# Patient Record
Sex: MALE | Race: WHITE | NOT HISPANIC OR LATINO | Employment: OTHER | URBAN - METROPOLITAN AREA
[De-identification: names, ages, dates, MRNs, and addresses within clinical notes are randomized per-mention and may not be internally consistent; named-entity substitution may affect disease eponyms.]

---

## 2024-06-20 ENCOUNTER — OFFICE VISIT (OUTPATIENT)
Dept: ORTHOPEDIC SURGERY | Facility: CLINIC | Age: 64
End: 2024-06-20
Payer: MEDICARE

## 2024-06-20 ENCOUNTER — HOSPITAL ENCOUNTER (OUTPATIENT)
Dept: RADIOLOGY | Facility: HOSPITAL | Age: 64
Discharge: HOME | End: 2024-06-20
Payer: MEDICARE

## 2024-06-20 DIAGNOSIS — M54.10 BACK PAIN WITH RADICULOPATHY: ICD-10-CM

## 2024-06-20 DIAGNOSIS — M54.10 BACK PAIN WITH RADICULOPATHY: Primary | ICD-10-CM

## 2024-06-20 PROCEDURE — 72100 X-RAY EXAM L-S SPINE 2/3 VWS: CPT

## 2024-06-20 PROCEDURE — 99204 OFFICE O/P NEW MOD 45 MIN: CPT | Performed by: PHYSICIAN ASSISTANT

## 2024-06-20 PROCEDURE — 72100 X-RAY EXAM L-S SPINE 2/3 VWS: CPT | Performed by: RADIOLOGY
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
## 2024-06-24 DIAGNOSIS — M54.10 BACK PAIN WITH RADICULOPATHY: ICD-10-CM

## 2024-06-24 DIAGNOSIS — M51.36 DDD (DEGENERATIVE DISC DISEASE), LUMBAR: ICD-10-CM

## 2024-06-24 DIAGNOSIS — M51.34 DDD (DEGENERATIVE DISC DISEASE), THORACIC: ICD-10-CM

## 2024-07-09 ENCOUNTER — HOSPITAL ENCOUNTER (OUTPATIENT)
Dept: RADIOLOGY | Facility: HOSPITAL | Age: 64
Discharge: HOME | End: 2024-07-09
Payer: MEDICARE

## 2024-07-09 DIAGNOSIS — M54.10 BACK PAIN WITH RADICULOPATHY: ICD-10-CM

## 2024-07-09 DIAGNOSIS — M51.34 DDD (DEGENERATIVE DISC DISEASE), THORACIC: ICD-10-CM

## 2024-07-09 DIAGNOSIS — M51.36 DDD (DEGENERATIVE DISC DISEASE), LUMBAR: ICD-10-CM

## 2024-07-09 PROCEDURE — 72148 MRI LUMBAR SPINE W/O DYE: CPT

## 2024-07-09 PROCEDURE — 72146 MRI CHEST SPINE W/O DYE: CPT | Performed by: RADIOLOGY

## 2024-07-09 PROCEDURE — 72146 MRI CHEST SPINE W/O DYE: CPT

## 2024-07-09 PROCEDURE — 72148 MRI LUMBAR SPINE W/O DYE: CPT | Performed by: RADIOLOGY

## 2024-07-23 ENCOUNTER — OFFICE VISIT (OUTPATIENT)
Dept: ORTHOPEDIC SURGERY | Facility: CLINIC | Age: 64
End: 2024-07-23
Payer: MEDICARE

## 2024-07-23 DIAGNOSIS — M54.16 LUMBAR RADICULOPATHY: Primary | ICD-10-CM

## 2024-07-23 DIAGNOSIS — M54.16 LUMBAR RADICULOPATHY: ICD-10-CM

## 2024-07-23 PROCEDURE — 99215 OFFICE O/P EST HI 40 MIN: CPT | Performed by: ORTHOPAEDIC SURGERY

## 2024-07-23 PROCEDURE — 1036F TOBACCO NON-USER: CPT | Performed by: ORTHOPAEDIC SURGERY

## 2024-07-23 ASSESSMENT — ENCOUNTER SYMPTOMS
LOSS OF SENSATION IN FEET: 0
OCCASIONAL FEELINGS OF UNSTEADINESS: 0
DEPRESSION: 0
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
## 2024-07-24 ENCOUNTER — OFFICE VISIT (OUTPATIENT)
Dept: CARDIOLOGY | Facility: CLINIC | Age: 64
End: 2024-07-24
Payer: MEDICARE

## 2024-07-24 ENCOUNTER — PHARMACY VISIT (OUTPATIENT)
Dept: PHARMACY | Facility: CLINIC | Age: 64
End: 2024-07-24
Payer: COMMERCIAL

## 2024-07-24 VITALS
BODY MASS INDEX: 26.39 KG/M2 | WEIGHT: 178.2 LBS | SYSTOLIC BLOOD PRESSURE: 130 MMHG | HEART RATE: 71 BPM | DIASTOLIC BLOOD PRESSURE: 62 MMHG | HEIGHT: 69 IN

## 2024-07-24 DIAGNOSIS — R94.31 ABNORMAL EKG: Primary | ICD-10-CM

## 2024-07-24 DIAGNOSIS — R01.1 CARDIAC MURMUR: ICD-10-CM

## 2024-07-24 DIAGNOSIS — I51.7 LVH (LEFT VENTRICULAR HYPERTROPHY): ICD-10-CM

## 2024-07-24 DIAGNOSIS — Z01.810 PREOPERATIVE CARDIOVASCULAR EXAMINATION: ICD-10-CM

## 2024-07-24 DIAGNOSIS — G47.33 OBSTRUCTIVE SLEEP APNEA SYNDROME, SEVERE: ICD-10-CM

## 2024-07-24 DIAGNOSIS — R06.83 SNORING: ICD-10-CM

## 2024-07-24 DIAGNOSIS — I35.0 NONRHEUMATIC AORTIC VALVE STENOSIS: ICD-10-CM

## 2024-07-24 DIAGNOSIS — R55 SYNCOPE AND COLLAPSE: ICD-10-CM

## 2024-07-24 DIAGNOSIS — F41.1 GAD (GENERALIZED ANXIETY DISORDER): Chronic | ICD-10-CM

## 2024-07-24 DIAGNOSIS — R00.2 PALPITATIONS: ICD-10-CM

## 2024-07-24 DIAGNOSIS — M48.02 CERVICAL SPINAL STENOSIS: ICD-10-CM

## 2024-07-24 DIAGNOSIS — M54.50 LOW BACK PAIN, UNSPECIFIED BACK PAIN LATERALITY, UNSPECIFIED CHRONICITY, UNSPECIFIED WHETHER SCIATICA PRESENT: ICD-10-CM

## 2024-07-24 DIAGNOSIS — I25.10 CORONARY ARTERY DISEASE INVOLVING NATIVE CORONARY ARTERY OF NATIVE HEART, UNSPECIFIED WHETHER ANGINA PRESENT: ICD-10-CM

## 2024-07-24 DIAGNOSIS — R42 DIZZINESS: ICD-10-CM

## 2024-07-24 DIAGNOSIS — R09.89 BILATERAL CAROTID BRUITS: ICD-10-CM

## 2024-07-24 DIAGNOSIS — R79.89 ELEVATED TROPONIN: ICD-10-CM

## 2024-07-24 DIAGNOSIS — Z87.891 PAST USE OF TOBACCO: ICD-10-CM

## 2024-07-24 DIAGNOSIS — R07.9 CHEST PAIN, UNSPECIFIED TYPE: ICD-10-CM

## 2024-07-24 DIAGNOSIS — E78.01 FAMILIAL HYPERCHOLESTEROLEMIA: ICD-10-CM

## 2024-07-24 DIAGNOSIS — R42 LIGHTHEADED: ICD-10-CM

## 2024-07-24 DIAGNOSIS — R06.09 DYSPNEA ON EXERTION: ICD-10-CM

## 2024-07-24 DIAGNOSIS — R93.1 ELEVATED CORONARY ARTERY CALCIUM SCORE: ICD-10-CM

## 2024-07-24 DIAGNOSIS — Z87.891 FORMER SMOKER, STOPPED SMOKING IN DISTANT PAST: ICD-10-CM

## 2024-07-24 DIAGNOSIS — Z82.49 FAMILY HISTORY OF ISCHEMIC HEART DISEASE: ICD-10-CM
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
## 2024-07-24 PROBLEM — M54.6 THORACIC SPINE PAIN: Status: ACTIVE | Noted: 2022-12-14

## 2024-07-24 PROBLEM — Z86.718 HISTORY OF DEEP VENOUS THROMBOSIS (DVT) OF DISTAL VEIN OF LEFT LOWER EXTREMITY: Status: ACTIVE | Noted: 2023-08-01

## 2024-07-24 PROBLEM — R03.0 ELEVATED BP WITHOUT DIAGNOSIS OF HYPERTENSION: Status: ACTIVE | Noted: 2023-08-01

## 2024-07-24 PROBLEM — M54.2 NECK PAIN, CHRONIC: Status: ACTIVE | Noted: 2022-12-14

## 2024-07-24 PROBLEM — R10.9 ABDOMINAL DISCOMFORT: Status: ACTIVE | Noted: 2024-07-19

## 2024-07-24 PROBLEM — G89.29 CHRONIC BACK PAIN: Chronic | Status: ACTIVE | Noted: 2024-07-19

## 2024-07-24 PROBLEM — G89.29 NECK PAIN, CHRONIC: Status: ACTIVE | Noted: 2022-12-14

## 2024-07-24 PROBLEM — M79.10 MYALGIA: Status: ACTIVE | Noted: 2022-12-14

## 2024-07-24 PROBLEM — M54.9 CHRONIC BACK PAIN: Chronic | Status: ACTIVE | Noted: 2024-07-19

## 2024-07-24 PROBLEM — N18.31 STAGE 3A CHRONIC KIDNEY DISEASE (MULTI): Chronic | Status: ACTIVE | Noted: 2023-08-01

## 2024-07-24 PROCEDURE — 99205 OFFICE O/P NEW HI 60 MIN: CPT | Performed by: INTERNAL MEDICINE

## 2024-07-24 PROCEDURE — RXMED WILLOW AMBULATORY MEDICATION CHARGE

## 2024-07-24 PROCEDURE — 1159F MED LIST DOCD IN RCRD: CPT | Performed by: INTERNAL MEDICINE

## 2024-07-24 PROCEDURE — 3008F BODY MASS INDEX DOCD: CPT | Performed by: INTERNAL MEDICINE

## 2024-07-24 RX ORDER — EVOLOCUMAB 140 MG/ML
140 INJECTION, SOLUTION SUBCUTANEOUS
Qty: 6 ML | Refills: 3 | Status: SHIPPED | OUTPATIENT
Start: 2024-07-24 | End: 2025-07-24

## 2024-07-24 RX ORDER — METOPROLOL SUCCINATE 25 MG/1
25 TABLET, EXTENDED RELEASE ORAL DAILY
Qty: 90 TABLET | Refills: 0 | Status: SHIPPED | OUTPATIENT
Start: 2024-07-24 | End: 2024-10-22

## 2024-07-24 RX ORDER — EZETIMIBE 10 MG/1
10 TABLET ORAL DAILY
Qty: 90 TABLET | Refills: 0 | Status: SHIPPED | OUTPATIENT
Start: 2024-07-24 | End: 2024-10-22

## 2024-07-24 RX ORDER — ASPIRIN 81 MG/1
81 TABLET ORAL DAILY
Qty: 90 TABLET | Refills: 3 | Status: SHIPPED | OUTPATIENT
Start: 2024-07-24 | End: 2025-07-24

## 2024-07-24 RX ORDER — TRAMADOL HYDROCHLORIDE 50 MG/1
50 TABLET ORAL EVERY 8 HOURS PRN
COMMUNITY
Start: 2024-07-19 | End: 2024-07-26

## 2024-07-24 RX ORDER — AMINOPHYLLINE 25 MG/ML
125 INJECTION, SOLUTION INTRAVENOUS ONCE AS NEEDED
OUTPATIENT
Start: 2024-07-24

## 2024-07-24 RX ORDER — TIZANIDINE HYDROCHLORIDE 4 MG/1
1 CAPSULE, GELATIN COATED ORAL
COMMUNITY
Start: 2023-12-07

## 2024-07-24 RX ORDER — ALPRAZOLAM 1 MG/1
1 TABLET ORAL
COMMUNITY

## 2024-07-24 RX ORDER — TADALAFIL 5 MG/1
5 TABLET ORAL DAILY
COMMUNITY

## 2024-07-24 RX ORDER — DOXYCYCLINE 100 MG/1
100 TABLET ORAL 2 TIMES DAILY
COMMUNITY
Start: 2024-01-18 | End: 2024-01-23

## 2024-07-24 RX ORDER — REGADENOSON 0.08 MG/ML
0.4 INJECTION, SOLUTION INTRAVENOUS
OUTPATIENT
Start: 2024-07-24

## 2024-07-24 NOTE — PATIENT INSTRUCTIONS
START REPATHA 140 MG/ML EVERY 2 WEEKS   START ZETIA 10 MG DAILY   START TOPROL XL 25 MG DAILY   START EC ASPIRIN 81 MG DAILY     Exercise diet weight loss program.     Stay plenty HYDRATED     Use My Chart portal for reviewing records, testing and contacting office.      Please bring all medicines, vitamins, and herbal supplements with you in original bottles to every appointment!!!!    Prescriptions will not be filled unless you are compliant with your follow up appointments or have a follow up appointment scheduled as per instruction of your physician. Refills should be requested at the time of your visit.
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
## 2024-07-25 ENCOUNTER — PHARMACY VISIT (OUTPATIENT)
Dept: PHARMACY | Facility: CLINIC | Age: 64
End: 2024-07-25
Payer: MEDICARE

## 2024-07-25 ENCOUNTER — TELEPHONE (OUTPATIENT)
Dept: CARDIOLOGY | Facility: CLINIC | Age: 64
End: 2024-07-25
Payer: MEDICARE

## 2024-07-25 NOTE — TELEPHONE ENCOUNTER
2 WEEK ZIO PATCH 61573/14591 No auth required per [facility] portal ref# [id number]

## 2024-08-29 ENCOUNTER — CLINICAL SUPPORT (OUTPATIENT)
Dept: CARDIOLOGY | Facility: HOSPITAL | Age: 64
End: 2024-08-29
Payer: MEDICARE

## 2024-08-29 ENCOUNTER — APPOINTMENT (OUTPATIENT)
Dept: CARDIOLOGY | Facility: CLINIC | Age: 64
End: 2024-08-29
Payer: MEDICARE

## 2024-08-29 ENCOUNTER — ANCILLARY PROCEDURE (OUTPATIENT)
Dept: CARDIOLOGY | Facility: HOSPITAL | Age: 64
End: 2024-08-29
Payer: MEDICARE

## 2024-08-29 DIAGNOSIS — R07.9 CHEST PAIN, UNSPECIFIED TYPE: ICD-10-CM

## 2024-08-29 DIAGNOSIS — Z82.49 FAMILY HISTORY OF ISCHEMIC HEART DISEASE: ICD-10-CM

## 2024-08-29 DIAGNOSIS — Z87.891 FORMER SMOKER, STOPPED SMOKING IN DISTANT PAST: ICD-10-CM

## 2024-08-29 DIAGNOSIS — R01.1 CARDIAC MURMUR: ICD-10-CM

## 2024-08-29 DIAGNOSIS — R06.09 DYSPNEA ON EXERTION: ICD-10-CM

## 2024-08-29 DIAGNOSIS — R42 DIZZINESS: ICD-10-CM

## 2024-08-29 DIAGNOSIS — Z87.891 PAST USE OF TOBACCO: ICD-10-CM

## 2024-08-29 DIAGNOSIS — I25.10 CORONARY ARTERY DISEASE INVOLVING NATIVE CORONARY ARTERY OF NATIVE HEART, UNSPECIFIED WHETHER ANGINA PRESENT: ICD-10-CM

## 2024-08-29 DIAGNOSIS — F41.1 GAD (GENERALIZED ANXIETY DISORDER): Chronic | ICD-10-CM

## 2024-08-29 DIAGNOSIS — R09.89 BILATERAL CAROTID BRUITS: ICD-10-CM

## 2024-08-29 DIAGNOSIS — R42 LIGHTHEADED: ICD-10-CM

## 2024-08-29 DIAGNOSIS — E78.01 FAMILIAL HYPERCHOLESTEROLEMIA: ICD-10-CM

## 2024-08-29 DIAGNOSIS — I35.0 NONRHEUMATIC AORTIC VALVE STENOSIS: ICD-10-CM

## 2024-08-29 DIAGNOSIS — R55 SYNCOPE AND COLLAPSE: ICD-10-CM

## 2024-08-29 DIAGNOSIS — G47.33 OBSTRUCTIVE SLEEP APNEA SYNDROME, SEVERE: ICD-10-CM

## 2024-08-29 DIAGNOSIS — M48.02 CERVICAL SPINAL STENOSIS: ICD-10-CM

## 2024-08-29 DIAGNOSIS — I51.7 LVH (LEFT VENTRICULAR HYPERTROPHY): ICD-10-CM

## 2024-08-29 DIAGNOSIS — R93.1 ELEVATED CORONARY ARTERY CALCIUM SCORE: ICD-10-CM

## 2024-08-29 DIAGNOSIS — R00.2 PALPITATIONS: ICD-10-CM

## 2024-08-29 DIAGNOSIS — R94.31 ABNORMAL EKG: ICD-10-CM

## 2024-08-29 DIAGNOSIS — M54.50 LOW BACK PAIN, UNSPECIFIED BACK PAIN LATERALITY, UNSPECIFIED CHRONICITY, UNSPECIFIED WHETHER SCIATICA PRESENT: ICD-10-CM

## 2024-08-29 DIAGNOSIS — R79.89 ELEVATED TROPONIN: ICD-10-CM

## 2024-08-29 DIAGNOSIS — R06.83 SNORING: ICD-10-CM

## 2024-08-29 DIAGNOSIS — Z01.810 PREOPERATIVE CARDIOVASCULAR EXAMINATION: ICD-10-CM

## 2024-08-29 DIAGNOSIS — Z13.6 ENCOUNTER FOR SCREENING FOR CARDIOVASCULAR DISORDERS: ICD-10-CM

## 2024-08-29 PROCEDURE — 76706 US ABDL AORTA SCREEN AAA: CPT | Performed by: INTERNAL MEDICINE

## 2024-08-29 PROCEDURE — 93880 EXTRACRANIAL BILAT STUDY: CPT

## 2024-08-29 PROCEDURE — 93306 TTE W/DOPPLER COMPLETE: CPT

## 2024-08-29 PROCEDURE — 93306 TTE W/DOPPLER COMPLETE: CPT | Performed by: INTERNAL MEDICINE

## 2024-08-29 PROCEDURE — 93880 EXTRACRANIAL BILAT STUDY: CPT | Performed by: INTERNAL MEDICINE

## 2024-08-29 PROCEDURE — 76706 US ABDL AORTA SCREEN AAA: CPT

## 2024-08-29 PROCEDURE — 78452 HT MUSCLE IMAGE SPECT MULT: CPT

## 2024-08-29 RX ORDER — AMINOPHYLLINE 25 MG/ML
125 INJECTION, SOLUTION INTRAVENOUS ONCE AS NEEDED
Status: SHIPPED | OUTPATIENT
Start: 2024-08-29

## 2024-08-29 RX ORDER — REGADENOSON 0.08 MG/ML
0.4 INJECTION, SOLUTION INTRAVENOUS
Status: COMPLETED | OUTPATIENT
Start: 2024-08-29 | End: 2024-08-29

## 2024-08-30 LAB
AORTIC VALVE MEAN GRADIENT: 7 MMHG
AORTIC VALVE PEAK VELOCITY: 1.75 M/S
AV PEAK GRADIENT: 12.3 MMHG
AVA (PEAK VEL): 3.54 CM2
AVA (VTI): 3.64 CM2
EJECTION FRACTION APICAL 4 CHAMBER: 55.3
EJECTION FRACTION: 68 %
LEFT VENTRICLE INTERNAL DIMENSION DIASTOLE: 4.78 CM (ref 3.5–6)
LEFT VENTRICULAR OUTFLOW TRACT DIAMETER: 2.3 CM
LV EJECTION FRACTION BIPLANE: 56 %
MITRAL VALVE E/A RATIO: 0.85

## 2024-09-04 ENCOUNTER — APPOINTMENT (OUTPATIENT)
Dept: ORTHOPEDIC SURGERY | Facility: CLINIC | Age: 64
End: 2024-09-04
Payer: MEDICARE

## 2024-09-06 ENCOUNTER — TELEPHONE (OUTPATIENT)
Dept: ORTHOPEDIC SURGERY | Facility: CLINIC | Age: 64
End: 2024-09-06
Payer: MEDICARE

## 2024-09-06 DIAGNOSIS — M48.061 SPINAL STENOSIS, LUMBAR REGION WITHOUT NEUROGENIC CLAUDICATION: Primary | ICD-10-CM

## 2024-09-06 DIAGNOSIS — M43.16 SPONDYLOLISTHESIS, LUMBAR REGION: ICD-10-CM

## 2024-09-09 PROBLEM — M48.061 SPINAL STENOSIS, LUMBAR REGION WITHOUT NEUROGENIC CLAUDICATION: Status: ACTIVE | Noted: 2024-09-06

## 2024-09-09 PROBLEM — M43.16 SPONDYLOLISTHESIS, LUMBAR REGION: Status: ACTIVE | Noted: 2024-09-06

## 2024-09-20 ENCOUNTER — TELEPHONE (OUTPATIENT)
Dept: CARDIOLOGY | Facility: CLINIC | Age: 64
End: 2024-09-20
Payer: MEDICARE

## 2024-09-20 NOTE — TELEPHONE ENCOUNTER
Received cardiac clearance form from [facility]. Patient is scheduled for surgery on 11/11/2024. Form placed in Dr. Isidoro Crews MD, [facility] office. Patient scheduled to be seen in office on 09/23/24.

## 2024-09-23 ENCOUNTER — APPOINTMENT (OUTPATIENT)
Dept: CARDIOLOGY | Facility: CLINIC | Age: 64
End: 2024-09-23
Payer: MEDICARE

## 2024-09-23 VITALS
BODY MASS INDEX: 26.84 KG/M2 | DIASTOLIC BLOOD PRESSURE: 70 MMHG | HEIGHT: 69 IN | WEIGHT: 181.2 LBS | SYSTOLIC BLOOD PRESSURE: 134 MMHG | HEART RATE: 67 BPM

## 2024-09-23 DIAGNOSIS — E78.01 FAMILIAL HYPERCHOLESTEROLEMIA: ICD-10-CM

## 2024-09-23 DIAGNOSIS — R09.89 BILATERAL CAROTID BRUITS: ICD-10-CM

## 2024-09-23 DIAGNOSIS — Z87.891 FORMER SMOKER, STOPPED SMOKING IN DISTANT PAST: ICD-10-CM

## 2024-09-23 DIAGNOSIS — G47.33 OBSTRUCTIVE SLEEP APNEA SYNDROME, SEVERE: ICD-10-CM

## 2024-09-23 DIAGNOSIS — R01.1 CARDIAC MURMUR: ICD-10-CM

## 2024-09-23 DIAGNOSIS — Z82.49 FAMILY HISTORY OF ISCHEMIC HEART DISEASE: ICD-10-CM

## 2024-09-23 DIAGNOSIS — R93.1 ELEVATED CORONARY ARTERY CALCIUM SCORE: ICD-10-CM

## 2024-09-23 DIAGNOSIS — R06.09 DYSPNEA ON EXERTION: ICD-10-CM

## 2024-09-23 DIAGNOSIS — I51.7 LVH (LEFT VENTRICULAR HYPERTROPHY): ICD-10-CM

## 2024-09-23 DIAGNOSIS — M54.50 LOW BACK PAIN, UNSPECIFIED BACK PAIN LATERALITY, UNSPECIFIED CHRONICITY, UNSPECIFIED WHETHER SCIATICA PRESENT: ICD-10-CM

## 2024-09-23 DIAGNOSIS — R42 LIGHTHEADED: ICD-10-CM

## 2024-09-23 DIAGNOSIS — Z87.891 PAST USE OF TOBACCO: ICD-10-CM

## 2024-09-23 DIAGNOSIS — M48.02 CERVICAL SPINAL STENOSIS: ICD-10-CM

## 2024-09-23 DIAGNOSIS — R42 DIZZINESS: ICD-10-CM

## 2024-09-23 DIAGNOSIS — F41.1 GAD (GENERALIZED ANXIETY DISORDER): Chronic | ICD-10-CM

## 2024-09-23 DIAGNOSIS — I25.10 CORONARY ARTERY DISEASE INVOLVING NATIVE CORONARY ARTERY OF NATIVE HEART, UNSPECIFIED WHETHER ANGINA PRESENT: ICD-10-CM

## 2024-09-23 DIAGNOSIS — R06.83 SNORING: ICD-10-CM

## 2024-09-23 DIAGNOSIS — R55 SYNCOPE AND COLLAPSE: ICD-10-CM

## 2024-09-23 DIAGNOSIS — R79.89 ELEVATED TROPONIN: ICD-10-CM

## 2024-09-23 DIAGNOSIS — R07.9 CHEST PAIN, UNSPECIFIED TYPE: ICD-10-CM

## 2024-09-23 DIAGNOSIS — Z01.810 PREOPERATIVE CARDIOVASCULAR EXAMINATION: ICD-10-CM

## 2024-09-23 DIAGNOSIS — R94.31 ABNORMAL EKG: ICD-10-CM

## 2024-09-23 DIAGNOSIS — I35.0 NONRHEUMATIC AORTIC VALVE STENOSIS: ICD-10-CM

## 2024-09-23 DIAGNOSIS — R00.2 PALPITATIONS: ICD-10-CM

## 2024-09-23 PROCEDURE — 1036F TOBACCO NON-USER: CPT | Performed by: INTERNAL MEDICINE

## 2024-09-23 PROCEDURE — 1159F MED LIST DOCD IN RCRD: CPT | Performed by: INTERNAL MEDICINE

## 2024-09-23 PROCEDURE — 99214 OFFICE O/P EST MOD 30 MIN: CPT | Performed by: INTERNAL MEDICINE

## 2024-09-23 PROCEDURE — 3008F BODY MASS INDEX DOCD: CPT | Performed by: INTERNAL MEDICINE

## 2024-09-23 RX ORDER — TRAMADOL HYDROCHLORIDE 50 MG/1
50 TABLET ORAL EVERY 8 HOURS PRN
COMMUNITY
Start: 2024-09-12 | End: 2024-10-12

## 2024-09-23 RX ORDER — EZETIMIBE 10 MG/1
10 TABLET ORAL DAILY
Qty: 90 TABLET | Refills: 1 | Status: SHIPPED | OUTPATIENT
Start: 2024-09-23

## 2024-09-23 RX ORDER — METOPROLOL SUCCINATE 25 MG/1
25 TABLET, EXTENDED RELEASE ORAL DAILY
Qty: 90 TABLET | Refills: 1 | Status: SHIPPED | OUTPATIENT
Start: 2024-09-23
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
## 2024-09-23 NOTE — PATIENT INSTRUCTIONS
PLEASE BRING ALL MEDICATION BOTTLES TO OFFICE VISITS     STAY WELL HYDRATED   HAVE LABS DONE BEFORE NEXT OFFICE VISIT (FASTING LABS)

## 2024-09-24 ENCOUNTER — APPOINTMENT (OUTPATIENT)
Dept: ORTHOPEDIC SURGERY | Facility: CLINIC | Age: 64
End: 2024-09-24
Payer: MEDICARE

## 2024-09-27 ENCOUNTER — TELEPHONE (OUTPATIENT)
Dept: CARDIOLOGY | Facility: CLINIC | Age: 64
End: 2024-09-27
Payer: MEDICARE

## 2024-09-27 NOTE — TELEPHONE ENCOUNTER
[facility]   [facility]    Benigno Dc Eugenio #[id number] had 2 ziopatch monitors.  It appears according to the note that one fell off.      The 1st one was dictated already so there is no where for the physician to make a dictation or for it to be put in his worklist.    So,  please have the ordering physician review the scan in Epic under today's date.  They can create a note under documentation for dictation or further recommendations regarding this 2nd study.    Thanks!!    Dr. Isidoro Crews MD, FACC please review. Mickey MANRIQUE

## 2024-10-10 ENCOUNTER — LAB (OUTPATIENT)
Dept: LAB | Facility: LAB | Age: 64
End: 2024-10-10
Payer: MEDICARE

## 2024-10-10 DIAGNOSIS — M25.60 STIFFNESS OF UNSPECIFIED JOINT, NOT ELSEWHERE CLASSIFIED: ICD-10-CM

## 2024-10-10 DIAGNOSIS — G89.29 OTHER CHRONIC PAIN: ICD-10-CM

## 2024-10-10 DIAGNOSIS — M54.9 DORSALGIA, UNSPECIFIED: Primary | ICD-10-CM

## 2024-10-10 LAB
ALBUMIN SERPL BCP-MCNC: 4.2 G/DL (ref 3.4–5)
ALP SERPL-CCNC: 42 U/L (ref 33–136)
ALT SERPL W P-5'-P-CCNC: 21 U/L (ref 10–52)
ANION GAP SERPL CALC-SCNC: 9 MMOL/L (ref 10–20)
AST SERPL W P-5'-P-CCNC: 17 U/L (ref 9–39)
BASOPHILS # BLD AUTO: 0.03 X10*3/UL (ref 0–0.1)
BASOPHILS NFR BLD AUTO: 0.4 %
BILIRUB SERPL-MCNC: 0.5 MG/DL (ref 0–1.2)
BUN SERPL-MCNC: 21 MG/DL (ref 6–23)
CALCIUM SERPL-MCNC: 9.3 MG/DL (ref 8.6–10.3)
CHLORIDE SERPL-SCNC: 104 MMOL/L (ref 98–107)
CK SERPL-CCNC: 86 U/L (ref 0–325)
CO2 SERPL-SCNC: 30 MMOL/L (ref 21–32)
CREAT SERPL-MCNC: 1.38 MG/DL (ref 0.5–1.3)
CRP SERPL-MCNC: 0.39 MG/DL
EGFRCR SERPLBLD CKD-EPI 2021: 56 ML/MIN/1.73M*2
EOSINOPHIL # BLD AUTO: 0.03 X10*3/UL (ref 0–0.7)
EOSINOPHIL NFR BLD AUTO: 0.4 %
ERYTHROCYTE [DISTWIDTH] IN BLOOD BY AUTOMATED COUNT: 14.6 % (ref 11.5–14.5)
ERYTHROCYTE [SEDIMENTATION RATE] IN BLOOD BY WESTERGREN METHOD: 6 MM/H (ref 0–20)
GLUCOSE SERPL-MCNC: 125 MG/DL (ref 74–99)
HCT VFR BLD AUTO: 48.2 % (ref 41–52)
HGB BLD-MCNC: 15.7 G/DL (ref 13.5–17.5)
IMM GRANULOCYTES # BLD AUTO: 0.02 X10*3/UL (ref 0–0.7)
IMM GRANULOCYTES NFR BLD AUTO: 0.3 % (ref 0–0.9)
LYMPHOCYTES # BLD AUTO: 1.55 X10*3/UL (ref 1.2–4.8)
LYMPHOCYTES NFR BLD AUTO: 19.9 %
MCH RBC QN AUTO: 28.5 PG (ref 26–34)
MCHC RBC AUTO-ENTMCNC: 32.6 G/DL (ref 32–36)
MCV RBC AUTO: 88 FL (ref 80–100)
MONOCYTES # BLD AUTO: 0.54 X10*3/UL (ref 0.1–1)
MONOCYTES NFR BLD AUTO: 6.9 %
NEUTROPHILS # BLD AUTO: 5.63 X10*3/UL (ref 1.2–7.7)
NEUTROPHILS NFR BLD AUTO: 72.1 %
NRBC BLD-RTO: 0 /100 WBCS (ref 0–0)
PLATELET # BLD AUTO: 218 X10*3/UL (ref 150–450)
POTASSIUM SERPL-SCNC: 3.8 MMOL/L (ref 3.5–5.3)
PROT SERPL-MCNC: 6 G/DL (ref 6.4–8.2)
RBC # BLD AUTO: 5.5 X10*6/UL (ref 4.5–5.9)
SODIUM SERPL-SCNC: 139 MMOL/L (ref 136–145)
WBC # BLD AUTO: 7.8 X10*3/UL (ref 4.4–11.3)

## 2024-10-10 PROCEDURE — 36415 COLL VENOUS BLD VENIPUNCTURE: CPT

## 2024-10-10 PROCEDURE — 85025 COMPLETE CBC W/AUTO DIFF WBC: CPT

## 2024-10-10 PROCEDURE — 86140 C-REACTIVE PROTEIN: CPT

## 2024-10-10 PROCEDURE — 80053 COMPREHEN METABOLIC PANEL: CPT

## 2024-10-10 PROCEDURE — 85652 RBC SED RATE AUTOMATED: CPT

## 2024-10-10 PROCEDURE — 82550 ASSAY OF CK (CPK): CPT

## 2024-10-22 ENCOUNTER — OFFICE VISIT (OUTPATIENT)
Dept: ORTHOPEDIC SURGERY | Facility: CLINIC | Age: 64
End: 2024-10-22
Payer: MEDICARE

## 2024-10-22 DIAGNOSIS — M54.16 LUMBAR RADICULOPATHY: Primary | ICD-10-CM

## 2024-10-22 PROCEDURE — L0650 LSO SC R ANT/POS PNL PRE OTS: HCPCS | Performed by: ORTHOPAEDIC SURGERY

## 2024-10-22 PROCEDURE — 99211 OFF/OP EST MAY X REQ PHY/QHP: CPT | Performed by: ORTHOPAEDIC SURGERY

## 2024-10-22 NOTE — H&P (VIEW-ONLY)
This note was created using Onformonicsriter.  Subjective  CHAVA Becker is a 64 year old male.    HPI  CHAVA Becker is a 64 year old male who presents today for f/u chronic low back pain, anxiety and DIANE. He is still scheduled for laminectomy with a  neurosurgeon on . He is trying to cope with the paint until then and says it has become increasingly more debilitating. He is having more trouble doing household tasks like cooking and doing the dishes. He is taking Tramadol TID which helps a little and taking muscle relaxer. He is feeling a little more anxious due to his upcoming surgery and is still taking Xanax at night to help him sleep and calm his anxiety. He is compliant with using his CPAP every night. He sleeps well other than waking up because of his pain. He has benefited from use of his CPAP.     Past Medical History:   PAST MEDICAL HISTORY   Diagnosis Date   • ADD (attention deficit disorder)    • Aortic stenosis, mild     had echo 2023   • Colon polyp ?   • DVT (deep venous thrombosis) ([facility]) 2016    s/p long car drive took anticoag 6 months.    • Erectile dysfunction    • Hypogonadism in male    • Insomnia     Secondary to MONTRELL   • Murmur    • DIANE (obstructive sleep apnea)     on auto-CPAP     Past Surgical History:   PAST SURGICAL HISTORY   Procedure Laterality Date   • COLONOSCOPY      colon polyp   • COLONOSCOPY SCREENING     • EYE SURGERY HX Left     Lasik surgery   • HERNIA REPAIR HX      Umbilical   • KNEE SURGERY HX Bilateral age 17    Bakari knee arthroscopy   • SKIN SURGERY HX      Moh's Procedure, basal cell carcinoma, on face     Family History:   FAMILY HISTORY    Problem Relation Age of Onset   • Cancer Mother         Leukemia   • Peripheral Artery Disease Father         Legs   • Ischemic Heart Disease Father          of MI during the night (s/p leg artery surgery)   • Cancer Sister         Squamous cell carcinoma of the rectum   • other (PVD) Sister    • COPD Maternal  "Grandmother    • Cancer Maternal Grandfather         Brain and Bone?   • Ischemic Heart Disease Paternal Grandfather    • Heart Attack Paternal Uncle      Social History:   Social History     Tobacco Use   • Smoking status: Former     Current packs/day: 0.00     Average packs/day: 1 pack/day for 19.0 years (19.0 ttl pk-yrs)     Types: Cigarettes     Start date: 1995     Quit date: 2014     Years since quitting: 10.8   • Smokeless tobacco: Never   Vaping Use   • Vaping status: Former   Substance Use Topics   • Alcohol use: Yes     Comment: occasional- 6 pack a week   • Drug use: Never     Current Medications: tiZANidine HCl (ZANAFLEX) 4 mg capsule, Take 1 capsule by mouth three times a day as needed., Disp: 270 capsule, Rfl: 1  Tadalafil (CIALIS) 5 mg tablet, Take 1 tablet by mouth every afternoon., Disp: 90 tablet, Rfl: 1  clindamycin (CLEOCIN) 1 % external solution, Apply to Posterior scalp behind ears once daily., Disp: 60 mL, Rfl: 5  doxycycline monohydrate (MONODOX) 100 mg capsule, Take 1 capsule by mouth once daily., Disp: 30 capsule, Rfl: 3  evolocumab (REPATHA SURECLICK) 140 mg/mL pen injector, Inject 140 mg subcutaneously every 2 weeks., Disp: , Rfl:   aspirin 81 mg cap, Take by mouth., Disp: , Rfl:   Syringe with Needle, Disp, 3 mL 23 gauge x 1 1/2\" syrg, Use as directed once a week, Disp: 10 Each, Rfl: 1  multivitamin (MULTIPLE VITAMIN ORAL), Take by mouth once daily., Disp: , Rfl:   omega-3/dha/epa/dpa/fish oil (OMEGA-3 2100 ORAL), Take 1 capsule by mouth., Disp: , Rfl:   ALPRAZolam (XANAX) 1 mg tablet, Take 1 tablet by mouth at bedtime as needed for up to 90 days., Disp: 90 tablet, Rfl: 0  traMADol (ULTRAM) 50 mg tablet, Take 1 tablet by mouth every 6 hours as needed for pain for up to 30 days., Disp: , Rfl:   CPAP/BIPAP/OTHER, Type .CPAPSettings into a note to see current settings/supplies/DME information., Disp: 1 Each, Rfl: 0  [DISCONTINUED] traMADol (ULTRAM) 50 mg tablet, Take 1 tablet by mouth every " "8 hours as needed for pain for up to 30 days. Patient should start on October 14, 2024., Disp: 90 tablet, Rfl: 0  metoprolol succinate ER (TOPROL XL) 25 mg 24 hr tablet, Take 25 mg by mouth. (Patient not taking: Reported on 10/22/2024), Disp: , Rfl:   [DISCONTINUED] ezetimibe (ZETIA) 10 mg tablet, Take 10 mg by mouth. (Patient not taking: Reported on 10/22/2024), Disp: , Rfl:   [DISCONTINUED] ALPRAZolam (XANAX) 1 mg tablet, Take 1 tablet by mouth at bedtime as needed for up to 90 days., Disp: 90 tablet, Rfl: 0  ketoconazole (NIZORAL) 2 % shampoo, Apply to wet scalp, lather, rinse after 2-3 minutes; use 1-3 times weekly., Disp: 360 mL, Rfl: 1  testosterone cypionate (DEPO-TESTOSTERONE) 200 mg/mL injection, INJECT 1 ML INTRAMUSCULARLY ONCE WEEKLY, Disp: 10 mL, Rfl: 0    No facility-administered encounter medications on file as of 10/22/2024.      Allergies:   ALLERGIES   Allergen Reactions   • Sertraline           Other: See Comments     Sexual side effects   • Statins-Hmg-Coa Red* Myalgia       Vitals: /80  Pulse 65  Temp (Src) 98.3 (Temporal)  Ht 5' 9\" (1.75m)  Wt 183 lb 13.8 oz (83.4kg)  SpO2 97%  BMI 27.14 kg/(m^2).            Review of Systems See HPI. ROS otherwise negative.      Objective  /80   Pulse 65   Temp 36.8 °C (98.3 °F) (Temporal)   Ht 175.3 cm (5' 9\")   Wt 83.4 kg (183 lb 13.8 oz)   SpO2 97%   BMI 27.15 kg/m²   Physical Exam  Vitals reviewed.   Constitutional:       General: He is not in acute distress.     Appearance: Normal appearance. He is not ill-appearing, toxic-appearing or diaphoretic.   Cardiovascular:      Rate and Rhythm: Normal rate and regular rhythm.   Pulmonary:      Effort: Pulmonary effort is normal.      Breath sounds: Normal breath sounds.   Neurological:      General: No focal deficit present.      Mental Status: He is alert and oriented to person, place, and time.   Psychiatric:         Mood and Affect: Mood normal.         Behavior: Behavior normal. "          Assessment and Plan  ASSESSMENT/PLAN:  1. Chronic bilateral low back pain with bilateral sciatica - ICD9: 724.2, 724.3, 338.29, ICD10: M54.42, M54.41, G89.29 (primary diagnosis)  Proceed with surgery as scheduled for next month.   Pt can temporarily increase Tramadol use to QID     PDMP website checked and validated. All prescriptions have been APPROPRIATELY filled.  No suspicious activity was identified. 10/22/2024 by Kristin Whitaker PA-C     -  TRAMADOL 50 MG TABLET    2. Anxiety - ICD9: 300.00, ICD10: F41.9    Continue Xanax QHS prn. Pt again reminded about risk of overdose with BZD and opioid use and he assures me that he never takes them together.   -  ALPRAZOLAM 1 MG TABLET    3. Stage 3a chronic kidney disease (HCC) - ICD9: 585.3, ICD10: N18.31  Reviewed results of recent labs.   -  eGFR: 63 Improving  -  Counseled on avoiding NSAIDs, adequate hydration    4. DIANE on CPAP - ICD9: 327.23, ICD10: G47.33  Continue nightly CPAP use. Order for new device and supplies given to the pt to send to his supplier, [employer].       -  PAP THERAPY ORDER  -  CPAP/BIPAP/OTHER    Kristin Whitaker PA-C

## 2024-10-22 NOTE — PROGRESS NOTES
"Dao Becker \"Zaria" is a 64 y.o. male who presents for Pre-op Exam of the Lower Back (L4-5 MIS TLIF for 11/11/24 at [facility]).    HPI:  64-year-old gentleman here for preop exam.  He is scheduled undergo an L4-5 TLIF with globus retractor.  He denies any fever chills nausea vomiting night sweats.  He has no bowel or bladder complaints.    Physical exam:  Well-nourished, well kept.No lymphangitis or lymphadenopathy in the examined extremities.  Gait normal.  Can stand on heels and toes.   Examination of the back shows tenderness in the paraspinous musculature.  There is decreased range of motion in all directions due to guarding/muscle spasms and pain at extremes.  There is good strength and no instability.  Examination of the lower extremities reveals no point tenderness, swelling, or deformity.  Range of motion of the hips, knees, and ankles are full without crepitance, instability, or exacerbation of pain.  Strength is 5/5 throughout.  No redness, abrasions, or lesions on extremities  Gross sensation intact in the extremities. Affect normal.  Alert and oriented ×3.  Coordination normal.    Assessment:  64-year-old gentleman here for preop exam.  He is scheduled undergo an L4-5 TLIF with globus retractor.  Pre and postoperative information instructions were given to the patient.  The brace was fitted today, brace instructions were given to the patient.  Risks and benefits were discussed with Dr. Nelson on July 23, 2024.  Lifting twisting bending restrictions were discussed.  All questions were answered.  Patient is ready to proceed with surgery.    Plan:  We will see him back in the office 2 weeks after his surgery for his first postop visit.  We will get AP lateral x-rays at that time.    Da Baez PA-C    "

## 2024-10-24 NOTE — H&P (VIEW-ONLY)
CHAVA Becker is a 64 year old male here for a Medicare wellness visit.      Medicare Health Risk Assessment    General Health Very good   Exercise: Minutes/Day 0 min   Exercise: Days/Week On average, how many days per week do you engage in moderate to strenuous exercise (like a brisk walk)?: 0 days (back pain\)   Alcohol: Daily Use 2-3 times a week   Alcohol: Drinks/Day 1 or 2   Alcohol: 6 or more drinks Never   Feel off balance No   Concerns: Teeth/Dentures No   Concerns: Sexual function No   Troubled by feelings None of the above   Frequency: Eating healthy diet  Nearly every day   ADLs requiring help None of the above   Safety precautions in home/vehicle Yes   Smoke, vape, chews tobacco No   Difficulty hearing Yes   Difficulty seeing No     Current Providers  Specialists: I have reviewed specialist-related care of the patient in the medical record.  Current care team: Patient Care Team:  Kristin Whitaker PA-C as PCP - General (Physician Assistant)  Outside specialists seen: Dr Nelson- Neurosurgery at   Dr Mary Randhawa- dermatology  Dr Crews- cardiology at     Medical/Family history review  Reviewed and updated problem list, medical/surgical/family/social history, medications, and allergies.    Opioid use review  Opioid Medications (last 90 days)          10/10/2024    23:59 10/14/2024    00:00 10/22/2024    00:00   Opioid Medications   tramadol HCl   *   50 mg q 8 H PRN ORAL -Discontinued     tramadol HCl    *   50 mg q 8 H PRN ORAL    *   50 mg q 8 H PRN ORAL-Discontinued   tramadol HCl     *   50 mg q 6 H PRN ORAL       Details         * Outpatient prescription             Prescribed tramadol HCl (last 90 days)    Does patient have risk factors for opioid abuse? No    Pain overview     Current pain concerns and treatment plan reviewed. Patient stable on current treatment plan.    Anxiety/Depression screening  PHQ-2 Score: 1   (Lower risk for depression)           Recommendation: no further  "intervention at this time    Cognitive screening  Mini Cog Score: 5    Cognitive screening reviewed and No further action needed (score 3-5).    Functional Observation  Was the patient's Timed Up & Go test unsteady or >= 12 seconds? No    Advance Care Planning  Patient did not wish or was not able to name a surrogate decision maker or provide an advance care plan            Measurements  /55   Pulse 64   Temp 37 °C (98.6 °F) (Temporal)   Ht 175.3 cm (5' 9\")   Wt 84 kg (185 lb 3 oz)   SpO2 99%   BMI 27.35 kg/m²     Vision Screening: Right: 20/40  Left: 20/ 40  Both: 20/40     Assessment/Plan  Medicare annual wellness visit, initial (Z00.00)  -  Counseled on healthy diet and regular exercise  -  Fall avoidance information provided  -  Personalized prevention plan provided  *Some images could not be shown."

## 2024-10-25 ASSESSMENT — DUKE ACTIVITY SCORE INDEX (DASI)
CAN YOU WALK A BLOCK OR TWO ON LEVEL GROUND: YES
CAN YOU RUN A SHORT DISTANCE: NO
CAN YOU DO HEAVY WORK AROUND THE HOUSE LIKE SCRUBBING FLOORS OR LIFTING AND MOVING HEAVY FURNITURE: NO
CAN YOU HAVE SEXUAL RELATIONS: YES
CAN YOU CLIMB A FLIGHT OF STAIRS OR WALK UP A HILL: YES
CAN YOU TAKE CARE OF YOURSELF (EAT, DRESS, BATHE, OR USE TOILET): YES
CAN YOU PARTICIPATE IN MODERATE RECREATIONAL ACTIVITIES LIKE GOLF, BOWLING, DANCING, DOUBLES TENNIS OR THROWING A BASEBALL OR FOOTBALL: NO
CAN YOU DO MODERATE WORK AROUND THE HOUSE LIKE VACUUMING, SWEEPING FLOORS OR CARRYING GROCERIES: YES
CAN YOU PARTICIPATE IN STRENOUS SPORTS LIKE SWIMMING, SINGLES TENNIS, FOOTBALL, BASKETBALL, OR SKIING: NO
CAN YOU DO LIGHT WORK AROUND THE HOUSE LIKE DUSTING OR WASHING DISHES: YES
TOTAL_SCORE: 28.7
DASI METS SCORE: 6.3
CAN YOU DO YARD WORK LIKE RAKING LEAVES, WEEDING OR PUSHING A MOWER: YES
CAN YOU WALK INDOORS, SUCH AS AROUND YOUR HOUSE: YES

## 2024-10-25 ASSESSMENT — LIFESTYLE VARIABLES: SMOKING_STATUS: NONSMOKER

## 2024-10-25 ASSESSMENT — ACTIVITIES OF DAILY LIVING (ADL): ADL_SCORE: 0

## 2024-10-26 RX ORDER — TRAMADOL HYDROCHLORIDE 50 MG/1
50 TABLET ORAL EVERY 8 HOURS PRN
COMMUNITY
End: 2024-11-12 | Stop reason: HOSPADM

## 2024-10-28 ENCOUNTER — LAB (OUTPATIENT)
Dept: LAB | Facility: LAB | Age: 64
End: 2024-10-28
Payer: MEDICARE

## 2024-10-28 ENCOUNTER — PRE-ADMISSION TESTING (OUTPATIENT)
Dept: PREADMISSION TESTING | Facility: HOSPITAL | Age: 64
End: 2024-10-28
Payer: MEDICARE

## 2024-10-28 VITALS
HEIGHT: 69 IN | WEIGHT: 177.91 LBS | BODY MASS INDEX: 26.35 KG/M2 | SYSTOLIC BLOOD PRESSURE: 152 MMHG | HEART RATE: 82 BPM | OXYGEN SATURATION: 94 % | TEMPERATURE: 99.5 F | DIASTOLIC BLOOD PRESSURE: 71 MMHG | RESPIRATION RATE: 16 BRPM

## 2024-10-28 DIAGNOSIS — Z01.818 PRE-OP EXAMINATION: ICD-10-CM

## 2024-10-28 DIAGNOSIS — M48.061 SPINAL STENOSIS, LUMBAR REGION WITHOUT NEUROGENIC CLAUDICATION: ICD-10-CM

## 2024-10-28 DIAGNOSIS — M43.16 SPONDYLOLISTHESIS, LUMBAR REGION: ICD-10-CM

## 2024-10-28 DIAGNOSIS — Z01.818 PREOP EXAMINATION: Primary | ICD-10-CM

## 2024-10-28 LAB
APTT PPP: 28 SECONDS (ref 27–38)
INR PPP: 1 (ref 0.9–1.1)
PROTHROMBIN TIME: 11.7 SECONDS (ref 9.8–12.8)

## 2024-10-28 PROCEDURE — 85610 PROTHROMBIN TIME: CPT

## 2024-10-28 PROCEDURE — 93005 ELECTROCARDIOGRAM TRACING: CPT

## 2024-10-28 PROCEDURE — 83036 HEMOGLOBIN GLYCOSYLATED A1C: CPT

## 2024-10-28 PROCEDURE — 87081 CULTURE SCREEN ONLY: CPT | Mod: STJLAB

## 2024-10-28 PROCEDURE — 93010 ELECTROCARDIOGRAM REPORT: CPT | Performed by: INTERNAL MEDICINE

## 2024-10-28 PROCEDURE — 36415 COLL VENOUS BLD VENIPUNCTURE: CPT

## 2024-10-28 PROCEDURE — 85730 THROMBOPLASTIN TIME PARTIAL: CPT

## 2024-10-28 RX ORDER — CHLORHEXIDINE GLUCONATE ORAL RINSE 1.2 MG/ML
SOLUTION DENTAL
Qty: 473 ML | Refills: 0 | Status: SHIPPED | OUTPATIENT
Start: 2024-10-28 | End: 2024-10-28

## 2024-10-28 RX ORDER — CHLORHEXIDINE GLUCONATE ORAL RINSE 1.2 MG/ML
SOLUTION DENTAL
Qty: 473 ML | Refills: 0 | Status: SHIPPED | OUTPATIENT
Start: 2024-10-28

## 2024-10-28 RX ORDER — DOXYCYCLINE 100 MG/1
100 CAPSULE ORAL DAILY
COMMUNITY

## 2024-10-28 ASSESSMENT — PAIN - FUNCTIONAL ASSESSMENT: PAIN_FUNCTIONAL_ASSESSMENT: 0-10

## 2024-10-28 NOTE — H&P (VIEW-ONLY)
"CPM/PAT Evaluation       Name: Dao Becker (Dao Becker \"Bneigno\")  /Age: 1960/64 y.o.     In-Person       Chief Complaint: Low back pain     HPI  Pleasant 64-year-old male presents with spinal stenosis, lumbar region without neurogenic claudication and spondylolisthesis left lumbar region scheduled for L4-5 laminectomy and posterior interbody fusion and posterior lateral fusion. He has been having pain for \"awhile\". States that the pain has been worsening and daily activities are difficulty to complete. He is using tramadol and muscle relaxers which help slightly. States pain radiates down his thighs down to his hips and knees. Denies recent fever/illness/chills.     Past Medical History:   Diagnosis Date    ADD (attention deficit disorder)     Anxiety     Arthritis     Carotid bruit     bilateral    Chronic kidney disease     Coronary artery disease     Dizziness     Hiatal hernia     History of continuous positive airway pressure (CPAP) therapy     HL (hearing loss)     Hyperlipidemia     Joint pain     Lumbar disc disease     Sleep apnea     Syncope      no explanation for episode       Past Surgical History:   Procedure Laterality Date    COLONOSCOPY      HERNIA REPAIR      KNEE ARTHROSCOPY W/ LASER Bilateral     LASIK         Patient  has no history on file for sexual activity.    Family History   Problem Relation Name Age of Onset    Heart disease Father         No Known Allergies    Prior to Admission medications    Medication Sig Start Date End Date Taking? Authorizing Provider   ALPRAZolam (Xanax) 1 mg tablet Take 1 tablet (1 mg) by mouth as needed at bedtime.    Historical Provider, MD   aspirin 81 mg EC tablet Take 1 tablet (81 mg) by mouth once daily. 24  Isidoro Crews MD   evolocumab (Repatha SureClick) 140 mg/mL injection Inject 1 mL (140 mg) under the skin every 14 (fourteen) days.  Patient not taking: Reported on 10/26/2024 7/24/24 7/24/25  Isidoro GRIFFIN " MD Eleonora   ezetimibe (Zetia) 10 mg tablet Take 1 tablet (10 mg) by mouth once daily.  Patient not taking: Reported on 10/26/2024 9/23/24   Isidoro Crews MD   metoprolol succinate XL (Toprol-XL) 25 mg 24 hr tablet Take 1 tablet (25 mg) by mouth once daily.  Patient not taking: Reported on 10/26/2024 9/23/24   Isidoro Crews MD   tadalafil (Cialis) 5 mg tablet Take 1 tablet (5 mg) by mouth once daily. In the afternoon    Historical Provider, MD   tiZANidine (Zanaflex) 4 mg capsule Take 1 capsule (4 mg) by mouth 3 times a day as needed for muscle spasms.    Historical Provider, MD   traMADol (Ultram) 50 mg tablet Take 1 tablet (50 mg) by mouth every 8 hours if needed for severe pain (7 - 10).    Historical Provider, MD        Constitutional: Negative for fever, chills, or sweats   ENMT: Negative for nasal discharge, congestion, ear pain, mouth pain, throat pain  Respiratory: Negative for cough, wheezing, shortness of breath   Cardiac: Negative for chest pain, dyspnea on exertion, palpitations   Gastrointestinal: Negative for nausea, vomiting, diarrhea, constipation, abdominal pain  Genitourinary: Negative for dysuria, flank pain, frequency, hematuria   Musculoskeletal: Negative for decreased ROM, pain, swelling, weakness. See HPI. Positive for neck stiffness.    Neurological: Negative for dizziness, confusion, headache  Psychiatric: Negative for mood changes   Skin: Negative for itching, rash, ulcer    Hematologic/Lymph: Negative for bruising, easy bleeding  Allergic/Immunologic: Negative itching, sneezing, swelling      Physical Exam  Vitals reviewed.   Constitutional:       Appearance: Normal appearance.   HENT:      Head: Normocephalic.      Mouth/Throat:      Mouth: Mucous membranes are moist.      Pharynx: Oropharynx is clear.   Eyes:      Pupils: Pupils are equal, round, and reactive to light.   Neck:      Vascular: Carotid bruit (bilateral) present.   Cardiovascular:      Rate and Rhythm:  Normal rate and regular rhythm.      Heart sounds: Murmur (2/6) heard.   Pulmonary:      Effort: Pulmonary effort is normal.      Breath sounds: Normal breath sounds.   Abdominal:      General: Bowel sounds are normal.      Palpations: Abdomen is soft.   Musculoskeletal:         General: Normal range of motion.      Cervical back: Normal range of motion.      Comments: Limited neck ROM    Skin:     General: Skin is warm and dry.   Neurological:      General: No focal deficit present.      Mental Status: He is alert and oriented to person, place, and time.   Psychiatric:         Mood and Affect: Mood normal.         Behavior: Behavior normal.          PAT AIRWAY:   Airway:     Mallampati::  II    Neck ROM::  Limited  normal        Visit Vitals  /71   Pulse 82   Temp 37.5 °C (99.5 °F) (Temporal)   Resp 16       DASI Risk Score      Flowsheet Row Pre-Admission Testing from 10/28/2024 in [facility] Questionnaire Series Submission from 9/18/2024 in [facility] with Generic Provider Kelsey   Can you take care of yourself (eat, dress, bathe, or use toilet)?  2.75 filed at 10/25/2024 1038 2.75  filed at 09/18/2024 0957   Can you walk indoors, such as around your house? 1.75 filed at 10/25/2024 1038 1.75  filed at 09/18/2024 0957   Can you walk a block or two on level ground?  2.75 filed at 10/25/2024 1038 2.75  filed at 09/18/2024 0957   Can you climb a flight of stairs or walk up a hill? 5.5 filed at 10/25/2024 1038 5.5  filed at 09/18/2024 0957   Can you run a short distance? 0 filed at 10/25/2024 1038 0  filed at 09/18/2024 0957   Can you do light work around the house like dusting or washing dishes? 2.7 filed at 10/25/2024 1038 2.7  filed at 09/18/2024 0957   Can you do moderate work around the house like vacuuming, sweeping floors or carrying groceries? 3.5 filed at 10/25/2024 1038 3.5  filed at 09/18/2024 0957   Can you do heavy work around the house like scrubbing floors or lifting and moving  heavy furniture?  0 filed at 10/25/2024 1038 0  filed at 09/18/2024 0957   Can you do yard work like raking leaves, weeding or pushing a mower? 4.5 filed at 10/25/2024 1038 0  filed at 09/18/2024 0957   Can you have sexual relations? 5.25 filed at 10/25/2024 1038 5.25  filed at 09/18/2024 0957   Can you participate in moderate recreational activities like golf, bowling, dancing, doubles tennis or throwing a baseball or football? 0 filed at 10/25/2024 1038 0  filed at 09/18/2024 0957   Can you participate in strenous sports like swimming, singles tennis, football, basketball, or skiing? 0 filed at 10/25/2024 1038 0  filed at 09/18/2024 0957   DASI SCORE 28.7 filed at 10/25/2024 1038 24.2  filed at 09/18/2024 0957   METS Score (Will be calculated only when all the questions are answered) 6.3 filed at 10/25/2024 1038 5.7  filed at 09/18/2024 0957          Caprini DVT Assessment      Flowsheet Row Pre-Admission Testing from 10/28/2024 in [facility]   DVT Score 11 filed at 10/25/2024 1036   Medical Factors History of DVT/PE filed at 10/25/2024 1036   Surgical Factors Major surgery planned, lasting over 3 hours filed at 10/25/2024 1036   BMI 30 or less filed at 10/25/2024 1036          Modified Frailty Index      Flowsheet Row Pre-Admission Testing from 10/28/2024 in [facility]   Non-independent functional status (problems with dressing, bathing, personal grooming, or cooking) 0 filed at 10/25/2024 1039   History of diabetes mellitus  0 filed at 10/25/2024 1039   History of COPD 0 filed at 10/25/2024 1039   History of CHF No filed at 10/25/2024 1039   History of MI 0 filed at 10/25/2024 1039   History of Percutaneous Coronary Intervention, Cardiac Surgery, or Angina No filed at 10/25/2024 1039   Hypertension requiring the use of medication  0 filed at 10/25/2024 1039   Peripheral vascular disease 0 filed at 10/25/2024 1039   Impaired sensorium (cognitive impairement or loss, clouding, or  delirium) 0 filed at 10/25/2024 1039   TIA or CVA withouy residual deficit 0 filed at 10/25/2024 1039   Cerebrovascular accident with deficit 0 filed at 10/25/2024 1039   Modified Frailty Index Calculator 0 filed at 10/25/2024 1039          CHADS2 Stroke Risk  Current as of 12 minutes ago        N/A 3 to 100%: High Risk   2 to < 3%: Medium Risk   0 to < 2%: Low Risk     Last Change: N/A          This score determines the patient's risk of having a stroke if the patient has atrial fibrillation.        This score is not applicable to this patient. Components are not calculated.          Revised Cardiac Risk Index      Flowsheet Row Pre-Admission Testing from 10/28/2024 in [facility]   High-Risk Surgery (Intraperitoneal, Intrathoracic,Suprainguinal vascular) 0 filed at 10/25/2024 1038   History of ischemic heart disease (History of MI, History of positive exercuse test, Current chest paint considered due to myocardial ischemia, Use of nitrate therapy, ECG with pathological Q Waves) 0 filed at 10/25/2024 1038   History of congestive heart failure (pulmonary edemia, bilateral rales or S3 gallop, Paroxysmal nocturnal dyspnea, CXR showing pulmonary vascular redistribution) 0 filed at 10/25/2024 1038   History of cerebrovascular disease (Prior TIA or stroke) 0 filed at 10/25/2024 1038   Pre-operative insulin treatment 0 filed at 10/25/2024 1038   Pre-operative creatinine>2 mg/dl 0 filed at 10/25/2024 1038   Revised Cardiac Risk Calculator 0 filed at 10/25/2024 1038          Apfel Simplified Score      Flowsheet Row Pre-Admission Testing from 10/28/2024 in [facility]   Smoking status 1 filed at 10/25/2024 1038   History of motion sickness or PONV  0 filed at 10/25/2024 1038   Use of postoperative opioids 1 filed at 10/25/2024 1038   Gender - Female 0=No filed at 10/25/2024 1038   Apfel Simplified Score Calculator 2 filed at 10/25/2024 1038          Risk Analysis Index Results This Encounter          10/25/2024  1039             Do you live in a place other than your own home?: 0    When did you begin living in the place you are currently residing?: Greater than one year ago    Any kidney failure, kidney not working well, or seeing a kidney doctor (nephrologist)? If yes, was this for kidney stones or another problem?: 8 Other (Comment)    Any history of chronic (long-term) congestive heart failure (CHF)?: 0 No    Any shortness of breath when resting?: 0 No    In the past five years, have you been diagnosed with or treated for cancer?: No    During the last 3 months has it become difficult for you to remember things or organize your thoughts?: 0 No    Have you lost weight of 10 pounds or more in the past 3 months without trying?: 4 Yes    Do you have any loss of appetitie?: 0 No    Getting Around (Mobility): 0 Can get around without help    Eatin Can plan and prepare own meals    Toiletin Can use toilet without any help    Personal Hygiene (Bathing, Hand Washing, Changing Clothes): 0 Can shower or bathe without any help    DANIEL Cancer History: Patient does not indicate history of cancer    Total Risk Analysis Index Score Without Cancer: 35    Total Risk Analysis Index Score: 35          Stop Bang Score      Flowsheet Row Pre-Admission Testing from 10/28/2024 in [facility]   Do you snore loudly? 1  [CPAP at home] filed at 10/25/2024 103   Do you often feel tired or fatigued after your sleep? 1 filed at 10/25/2024 1036   Has anyone ever observed you stop breathing in your sleep? 1 filed at 10/25/2024 1036   Do you have or are you being treated for high blood pressure? 1 filed at 10/25/2024 1036   Recent BMI (Calculated) 26.8 filed at 10/25/2024 1036   Is BMI greater than 35 kg/m2? 0=No filed at 10/25/2024 1036   Age older than 50 years old? 1=Yes filed at 10/25/2024 1036   Is your neck circumference greater than 17 inches (Male) or 16 inches (Female)? 0 filed at 10/25/2024 1036    Gender - Male 1=Yes filed at 10/25/2024 1036   STOP-BANG Total Score 6 filed at 10/25/2024 1036          Prodigy: High Risk  Total Score: 19              Prodigy Age Score      Prodigy Gender Score     Prodigy Previous Opioid Use Score           ARISCAT Score for Postoperative Pulmonary Complications      Flowsheet Row Pre-Admission Testing from 10/28/2024 in [facility]   Age, years  3 filed at 10/28/2024 1411   Preoperative SpO2 8 filed at 10/28/2024 1411   Respiratory infection in the last month Either upper or lower (i.e., URI, bronchitis, pneumonia), with fever and antibiotic treatment 0 filed at 10/28/2024 1411   Preoperative anemoa (Hgb less than 10 g/dl) 0 filed at 10/28/2024 1411   Surgical incision  0 filed at 10/28/2024 1411   Duration of surgery  16 filed at 10/28/2024 1411   Emergency Procedure  0 filed at 10/28/2024 1411   ARISCAT Total Score  27 filed at 10/28/2024 1411          Kendall Perioperative Risk for Myocardial Infarction or Cardiac Arrest (ELIAS)    No data to display         Assessment and Plan:     Assessment and Plan:     Preop:   OR with Dr. Nelosn on 11/11/24 for L4-5 laminectomy and posterior interbody fusion and posterior lateral fusion.  Labs ordered per anesthesia guidelines.   EKG obtained and enclosed. NSR. Possible left atrial enlargement. LVH. T wave abnormality. Prior EKG on file under media. Routed EKG to Dr. Crews     Has cardiac clearance from Dr. Crews     Neurologic:   The patient is at an increased risk for post operative delirium secondary to age >/=65 , decrease functional status, polypharmacy , renal insufficiency , and type and duration of surgery . Preoperative brain exercise educational handout provided to patient.  The patient is at an increased risk for perioperative stroke secondary to increased age, HLD, and general anesthesia.    Cardiac:  Hyperlipidemia: Cannot tolerate HLD medications   Cardiac murmur: Known. Follows with   [facility]   Bilateral carotid bruit: Recently US showed less than 50% stenosis bilaterally.   Impression from Dr. Crews note on 9/23/24:   Syncope history 2019  Carotid bruits, bilateral, negative carotid ultrasound 8/2024.  Aortic stenosis, sclerosis without stenosis  Coronary artery disease, Elevated CT calcium score, 459, 10/2023  Negative Lexiscan perfusion stress test, LVEF 56%, 8/2024,  Normal left ventricular function, LVEF 65-70%, echocardiogram 8/2024  LV diastolic dysfunction  Left ventricular hypertrophy  Negative abdominal ultrasound for abdominal aortic aneurysm, 8/2024.  Hyperlipidemia, familial  Recent holter monitor in August, 2024: Summary:  There were episodes of narrow complex tachycardia consistent with supraventricular tachycardia.  Fastest heart rate of 207 bpm, this tachycardia could be paroxysmal atrial tachycardia because it speeds up during the tachycardia.  The second episode is regular, shorter and maximum rate is 150 bpm.  I did not see documentation of the third episode that was commented on.  Nonspecific ST-T abnormalities noted  There is left atrial abnormality on EKG  Isolated supraventricular and ventricular premature beats are noted  No bradycardia dysrhythmia  No symptoms were recorded.  Please note that the monitoring interval was only 2 days and 21 hours after the artifact was excluded.  Echocardiogram from 8/24: CONCLUSIONS:   1. Left ventricular ejection fraction is normal, by visual estimate at 65-70%.   2. Left ventricular diastolic filling was indeterminate.   3. There is normal right ventricular global systolic function.   4. There is no mitral stenosis. There is trivial mitral regurgitation.   5. PA pressure could not be estimated because there was no tricuspid regurgitation.   6. Within the limits of the study there is no aortic stenosis or regurgitation. There is sclerosis particularly involving the left coronary cusp.   7. Left ventricular outflow tract  "velocity slightly increased at 1.4 m/s, without evidence of dynamic LVOT obstruction.   8. IVC appears normal.   9. No prior echo report is available for comparison.  Vascular US Carotid Artery Duplex Bilateral: Right Carotid: Findings are consistent with less than 50% stenosis of the right proximal internal carotid artery. Right external carotid artery appears patent with no evidence of stenosis. The right vertebral artery is patent with antegrade flow. No evidence of hemodynamically significant stenosis in the right subclavian artery.  Left Carotid: Findings are consistent with less than 50% stenosis of the left proximal internal carotid artery. Left external carotid artery appears patent with no evidence of stenosis. The left vertebral artery is patent with antegrade flow. No evidence of hemodynamically significant stenosis in the left subclavian artery  ELIAS score which indicates a   % risk of intraoperative or 30-day postoperative MACE  Dangelo Activity Status Index (DASI)  DASI Score: 28.7   MET Score: 6.3  RCRI 0, 3.9% risk for postoperative MACE    Pulmonary:   DIANE: Uses CPAP  STOP-BANG score of 6. Known DIANE  ARISCAT:    27  points which is a intermediate (13.3%) risk of in-hospital post-op pulmonary complications     GI:  Apfel: 2 points 39% risk for post operative N/V    /Renal:   CKD: Stable based on previous lab work     Neuro-muscular:   Spinal stenosis: Reason for upcoming procedure. Has arthritis \"on my whole spine\". Endorses limited neck ROM at times.     Psychiatric:   ADD: Stable   MONTRELL: Slightly worse due to upcoming surgery.     Dermatology:  Takes doxycycline for skin \"breakouts\". None actively.     Hematologic:   DVT: Believed to be due to a long car drive. Was on anticoag for roughly 6 months.   Caprini score 11, patient at high risk for perioperative DVT. Patient provided with VTE education/handout.     Skin check: Patient was instructed to make surgeon aware of any skin changes/concerns prior " to surgery.     Anesthesia: States that he has woken up during colonoscopies previously.       *See risk scores as previously documented

## 2024-10-29 LAB
EST. AVERAGE GLUCOSE BLD GHB EST-MCNC: 108 MG/DL
HBA1C MFR BLD: 5.4 %

## 2024-10-30 LAB — STAPHYLOCOCCUS SPEC CULT: NORMAL

## 2024-10-31 ENCOUNTER — ANESTHESIA EVENT (OUTPATIENT)
Dept: OPERATING ROOM | Facility: HOSPITAL | Age: 64
End: 2024-10-31
Payer: MEDICARE

## 2024-11-01 LAB
ATRIAL RATE: 80 BPM
P AXIS: 65 DEGREES
P OFFSET: 210 MS
P ONSET: 159 MS
PR INTERVAL: 120 MS
Q ONSET: 219 MS
QRS COUNT: 14 BEATS
QRS DURATION: 88 MS
QT INTERVAL: 344 MS
QTC CALCULATION(BAZETT): 396 MS
QTC FREDERICIA: 378 MS
R AXIS: 75 DEGREES
T AXIS: 102 DEGREES
T OFFSET: 391 MS
VENTRICULAR RATE: 80 BPM

## 2024-11-11 ENCOUNTER — HOSPITAL ENCOUNTER (INPATIENT)
Facility: HOSPITAL | Age: 64
LOS: 1 days | Discharge: HOME | End: 2024-11-12
Attending: ORTHOPAEDIC SURGERY | Admitting: ORTHOPAEDIC SURGERY
Payer: MEDICARE

## 2024-11-11 ENCOUNTER — APPOINTMENT (OUTPATIENT)
Dept: RADIOLOGY | Facility: HOSPITAL | Age: 64
End: 2024-11-11
Payer: MEDICARE

## 2024-11-11 ENCOUNTER — ANESTHESIA (OUTPATIENT)
Dept: OPERATING ROOM | Facility: HOSPITAL | Age: 64
End: 2024-11-11
Payer: MEDICARE

## 2024-11-11 DIAGNOSIS — I35.0 NONRHEUMATIC AORTIC VALVE STENOSIS: ICD-10-CM

## 2024-11-11 DIAGNOSIS — R06.83 SNORING: ICD-10-CM

## 2024-11-11 DIAGNOSIS — R93.1 ELEVATED CORONARY ARTERY CALCIUM SCORE: ICD-10-CM

## 2024-11-11 DIAGNOSIS — E78.01 FAMILIAL HYPERCHOLESTEROLEMIA: ICD-10-CM

## 2024-11-11 DIAGNOSIS — R09.89 BILATERAL CAROTID BRUITS: ICD-10-CM

## 2024-11-11 DIAGNOSIS — M43.16 SPONDYLOLISTHESIS, LUMBAR REGION: ICD-10-CM

## 2024-11-11 DIAGNOSIS — Z01.810 PREOPERATIVE CARDIOVASCULAR EXAMINATION: ICD-10-CM

## 2024-11-11 DIAGNOSIS — R55 SYNCOPE AND COLLAPSE: ICD-10-CM

## 2024-11-11 DIAGNOSIS — R42 LIGHTHEADED: ICD-10-CM

## 2024-11-11 DIAGNOSIS — I51.7 LVH (LEFT VENTRICULAR HYPERTROPHY): ICD-10-CM

## 2024-11-11 DIAGNOSIS — M54.50 LOW BACK PAIN, UNSPECIFIED BACK PAIN LATERALITY, UNSPECIFIED CHRONICITY, UNSPECIFIED WHETHER SCIATICA PRESENT: ICD-10-CM

## 2024-11-11 DIAGNOSIS — Z82.49 FAMILY HISTORY OF ISCHEMIC HEART DISEASE: ICD-10-CM

## 2024-11-11 DIAGNOSIS — Z87.891 PAST USE OF TOBACCO: ICD-10-CM

## 2024-11-11 DIAGNOSIS — R94.31 ABNORMAL EKG: ICD-10-CM

## 2024-11-11 DIAGNOSIS — M48.02 CERVICAL SPINAL STENOSIS: ICD-10-CM

## 2024-11-11 DIAGNOSIS — Z87.891 FORMER SMOKER, STOPPED SMOKING IN DISTANT PAST: ICD-10-CM

## 2024-11-11 DIAGNOSIS — R79.89 ELEVATED TROPONIN: ICD-10-CM

## 2024-11-11 DIAGNOSIS — I25.10 CORONARY ARTERY DISEASE INVOLVING NATIVE CORONARY ARTERY OF NATIVE HEART, UNSPECIFIED WHETHER ANGINA PRESENT: ICD-10-CM

## 2024-11-11 DIAGNOSIS — G47.33 OBSTRUCTIVE SLEEP APNEA SYNDROME, SEVERE: ICD-10-CM

## 2024-11-11 DIAGNOSIS — R42 DIZZINESS: ICD-10-CM

## 2024-11-11 DIAGNOSIS — R01.1 CARDIAC MURMUR: ICD-10-CM

## 2024-11-11 DIAGNOSIS — F41.1 GAD (GENERALIZED ANXIETY DISORDER): Chronic | ICD-10-CM

## 2024-11-11 DIAGNOSIS — R00.2 PALPITATIONS: ICD-10-CM

## 2024-11-11 DIAGNOSIS — R07.9 CHEST PAIN, UNSPECIFIED TYPE: ICD-10-CM

## 2024-11-11 DIAGNOSIS — R06.09 DYSPNEA ON EXERTION: ICD-10-CM

## 2024-11-11 DIAGNOSIS — M54.9 BACK PAIN WITH RADIATION: Primary | ICD-10-CM

## 2024-11-11 DIAGNOSIS — M48.061 SPINAL STENOSIS, LUMBAR REGION WITHOUT NEUROGENIC CLAUDICATION: ICD-10-CM

## 2024-11-11 PROCEDURE — 01NB0ZZ RELEASE LUMBAR NERVE, OPEN APPROACH: ICD-10-PCS | Performed by: ORTHOPAEDIC SURGERY

## 2024-11-11 PROCEDURE — C1889 IMPLANT/INSERT DEVICE, NOC: HCPCS | Performed by: ORTHOPAEDIC SURGERY

## 2024-11-11 PROCEDURE — 0ST20ZZ RESECTION OF LUMBAR VERTEBRAL DISC, OPEN APPROACH: ICD-10-PCS | Performed by: ORTHOPAEDIC SURGERY

## 2024-11-11 PROCEDURE — 2500000001 HC RX 250 WO HCPCS SELF ADMINISTERED DRUGS (ALT 637 FOR MEDICARE OP): Performed by: PHYSICIAN ASSISTANT

## 2024-11-11 PROCEDURE — 0SG00K1 FUSION OF LUMBAR VERTEBRAL JOINT WITH NONAUTOLOGOUS TISSUE SUBSTITUTE, POSTERIOR APPROACH, POSTERIOR COLUMN, OPEN APPROACH: ICD-10-PCS | Performed by: ORTHOPAEDIC SURGERY

## 2024-11-11 PROCEDURE — 63267 EXCISE INTRSPINL LESION LMBR: CPT | Performed by: PHYSICIAN ASSISTANT

## 2024-11-11 PROCEDURE — 3700000002 HC GENERAL ANESTHESIA TIME - EACH INCREMENTAL 1 MINUTE: Performed by: ORTHOPAEDIC SURGERY

## 2024-11-11 PROCEDURE — 20930 SP BONE ALGRFT MORSEL ADD-ON: CPT | Performed by: ORTHOPAEDIC SURGERY

## 2024-11-11 PROCEDURE — 2500000002 HC RX 250 W HCPCS SELF ADMINISTERED DRUGS (ALT 637 FOR MEDICARE OP, ALT 636 FOR OP/ED): Performed by: PHYSICIAN ASSISTANT

## 2024-11-11 PROCEDURE — 3600000010 HC OR TIME - EACH INCREMENTAL 1 MINUTE - PROCEDURE LEVEL FIVE: Performed by: ORTHOPAEDIC SURGERY

## 2024-11-11 PROCEDURE — 99221 1ST HOSP IP/OBS SF/LOW 40: CPT | Performed by: INTERNAL MEDICINE

## 2024-11-11 PROCEDURE — A22630 PR ARTHRODESIS POSTERIOR INTERBODY LUMBAR: Performed by: ANESTHESIOLOGY

## 2024-11-11 PROCEDURE — 64635 DESTROY LUMB/SAC FACET JNT: CPT | Performed by: ORTHOPAEDIC SURGERY

## 2024-11-11 PROCEDURE — 2780000003 HC OR 278 NO HCPCS: Performed by: ORTHOPAEDIC SURGERY

## 2024-11-11 PROCEDURE — 2500000005 HC RX 250 GENERAL PHARMACY W/O HCPCS: Performed by: PHYSICIAN ASSISTANT

## 2024-11-11 PROCEDURE — 2500000004 HC RX 250 GENERAL PHARMACY W/ HCPCS (ALT 636 FOR OP/ED): Performed by: ANESTHESIOLOGIST ASSISTANT

## 2024-11-11 PROCEDURE — C1713 ANCHOR/SCREW BN/BN,TIS/BN: HCPCS | Performed by: ORTHOPAEDIC SURGERY

## 2024-11-11 PROCEDURE — 01553ZZ DESTRUCTION OF MEDIAN NERVE, PERCUTANEOUS APPROACH: ICD-10-PCS | Performed by: ORTHOPAEDIC SURGERY

## 2024-11-11 PROCEDURE — 63052 LAM FACETC/FRMT ARTHRD LUM 1: CPT | Performed by: ORTHOPAEDIC SURGERY

## 2024-11-11 PROCEDURE — 63267 EXCISE INTRSPINL LESION LMBR: CPT | Performed by: ORTHOPAEDIC SURGERY

## 2024-11-11 PROCEDURE — 63047 LAM FACETEC & FORAMOT LUMBAR: CPT | Performed by: ORTHOPAEDIC SURGERY

## 2024-11-11 PROCEDURE — 63047 LAM FACETEC & FORAMOT LUMBAR: CPT | Performed by: PHYSICIAN ASSISTANT

## 2024-11-11 PROCEDURE — 2500000004 HC RX 250 GENERAL PHARMACY W/ HCPCS (ALT 636 FOR OP/ED): Performed by: ORTHOPAEDIC SURGERY

## 2024-11-11 PROCEDURE — A22630 PR ARTHRODESIS POSTERIOR INTERBODY LUMBAR: Performed by: ANESTHESIOLOGIST ASSISTANT

## 2024-11-11 PROCEDURE — 22840 INSERT SPINE FIXATION DEVICE: CPT | Performed by: PHYSICIAN ASSISTANT

## 2024-11-11 PROCEDURE — 2720000007 HC OR 272 NO HCPCS: Performed by: ORTHOPAEDIC SURGERY

## 2024-11-11 PROCEDURE — 20936 SP BONE AGRFT LOCAL ADD-ON: CPT | Performed by: ORTHOPAEDIC SURGERY

## 2024-11-11 PROCEDURE — 22633 ARTHRD CMBN 1NTRSPC LUMBAR: CPT | Performed by: ORTHOPAEDIC SURGERY

## 2024-11-11 PROCEDURE — 00BY0ZZ EXCISION OF LUMBAR SPINAL CORD, OPEN APPROACH: ICD-10-PCS | Performed by: ORTHOPAEDIC SURGERY

## 2024-11-11 PROCEDURE — 63052 LAM FACETC/FRMT ARTHRD LUM 1: CPT | Performed by: PHYSICIAN ASSISTANT

## 2024-11-11 PROCEDURE — 22853 INSJ BIOMECHANICAL DEVICE: CPT | Performed by: PHYSICIAN ASSISTANT

## 2024-11-11 PROCEDURE — 22853 INSJ BIOMECHANICAL DEVICE: CPT | Performed by: ORTHOPAEDIC SURGERY

## 2024-11-11 PROCEDURE — 1100000001 HC PRIVATE ROOM DAILY

## 2024-11-11 PROCEDURE — 2500000004 HC RX 250 GENERAL PHARMACY W/ HCPCS (ALT 636 FOR OP/ED): Performed by: ANESTHESIOLOGY

## 2024-11-11 PROCEDURE — 20930 SP BONE ALGRFT MORSEL ADD-ON: CPT | Performed by: PHYSICIAN ASSISTANT

## 2024-11-11 PROCEDURE — 22840 INSERT SPINE FIXATION DEVICE: CPT | Performed by: ORTHOPAEDIC SURGERY

## 2024-11-11 PROCEDURE — 7100000001 HC RECOVERY ROOM TIME - INITIAL BASE CHARGE: Performed by: ORTHOPAEDIC SURGERY

## 2024-11-11 PROCEDURE — 22633 ARTHRD CMBN 1NTRSPC LUMBAR: CPT | Performed by: PHYSICIAN ASSISTANT

## 2024-11-11 PROCEDURE — 3600000005 HC OR TIME - INITIAL BASE CHARGE - PROCEDURE LEVEL FIVE: Performed by: ORTHOPAEDIC SURGERY

## 2024-11-11 PROCEDURE — 0SG00AJ FUSION OF LUMBAR VERTEBRAL JOINT WITH INTERBODY FUSION DEVICE, POSTERIOR APPROACH, ANTERIOR COLUMN, OPEN APPROACH: ICD-10-PCS | Performed by: ORTHOPAEDIC SURGERY

## 2024-11-11 PROCEDURE — 2500000005 HC RX 250 GENERAL PHARMACY W/O HCPCS: Performed by: ANESTHESIOLOGIST ASSISTANT

## 2024-11-11 PROCEDURE — 2500000004 HC RX 250 GENERAL PHARMACY W/ HCPCS (ALT 636 FOR OP/ED): Mod: JZ | Performed by: PHYSICIAN ASSISTANT

## 2024-11-11 PROCEDURE — 3700000001 HC GENERAL ANESTHESIA TIME - INITIAL BASE CHARGE: Performed by: ORTHOPAEDIC SURGERY

## 2024-11-11 PROCEDURE — 7100000002 HC RECOVERY ROOM TIME - EACH INCREMENTAL 1 MINUTE: Performed by: ORTHOPAEDIC SURGERY

## 2024-11-11 PROCEDURE — 20936 SP BONE AGRFT LOCAL ADD-ON: CPT | Performed by: PHYSICIAN ASSISTANT

## 2024-11-11 PROCEDURE — 2500000001 HC RX 250 WO HCPCS SELF ADMINISTERED DRUGS (ALT 637 FOR MEDICARE OP): Performed by: INTERNAL MEDICINE

## 2024-11-11 DEVICE — K-WIRE, RELINE MAS NITINOL, BLUNT THREAD: Type: IMPLANTABLE DEVICE | Site: SPINE LUMBAR | Status: NON-FUNCTIONAL

## 2024-11-11 DEVICE — SCREW, RELINE LOCK, 5.5MM OPEN TULIP: Type: IMPLANTABLE DEVICE | Site: SPINE LUMBAR | Status: FUNCTIONAL

## 2024-11-11 DEVICE — IMPLANTABLE DEVICE: Type: IMPLANTABLE DEVICE | Site: SPINE LUMBAR | Status: FUNCTIONAL

## 2024-11-11 DEVICE — SCREW, RELINE MAS RED, 6.5X45MM POLY 2C: Type: IMPLANTABLE DEVICE | Site: SPINE LUMBAR | Status: FUNCTIONAL

## 2024-11-11 DEVICE — BONE GRAFT SUBSTITUTE - SILICATE SUBSTITUTED CALCIUM PHOSPHATE - 7.5ML PACK SIZE
Type: IMPLANTABLE DEVICE | Site: SPINE LUMBAR | Status: FUNCTIONAL
Brand: ACTIFUSE MIS

## 2024-11-11 DEVICE — ROD, RELINE MAS, 5.5 X 35MM, LORDOTIC: Type: IMPLANTABLE DEVICE | Site: SPINE LUMBAR | Status: FUNCTIONAL

## 2024-11-11 DEVICE — ROD, RELINE MAS, 5.5 X 40MM, LORDOTIC: Type: IMPLANTABLE DEVICE | Site: SPINE LUMBAR | Status: FUNCTIONAL

## 2024-11-11 DEVICE — SCREW, RELINE MAS RED, 6.5X50MM POLY 2C: Type: IMPLANTABLE DEVICE | Site: SPINE LUMBAR | Status: FUNCTIONAL

## 2024-11-11 RX ORDER — BUPIVACAINE HYDROCHLORIDE 5 MG/ML
INJECTION, SOLUTION PERINEURAL AS NEEDED
Status: DISCONTINUED | OUTPATIENT
Start: 2024-11-11 | End: 2024-11-11 | Stop reason: HOSPADM

## 2024-11-11 RX ORDER — METOCLOPRAMIDE HYDROCHLORIDE 5 MG/ML
10 INJECTION INTRAMUSCULAR; INTRAVENOUS ONCE AS NEEDED
Status: DISCONTINUED | OUTPATIENT
Start: 2024-11-11 | End: 2024-11-11 | Stop reason: HOSPADM

## 2024-11-11 RX ORDER — OXYCODONE HYDROCHLORIDE 10 MG/1
10 TABLET ORAL EVERY 4 HOURS PRN
Status: DISCONTINUED | OUTPATIENT
Start: 2024-11-11 | End: 2024-11-12 | Stop reason: HOSPADM

## 2024-11-11 RX ORDER — ONDANSETRON HYDROCHLORIDE 2 MG/ML
INJECTION, SOLUTION INTRAVENOUS AS NEEDED
Status: DISCONTINUED | OUTPATIENT
Start: 2024-11-11 | End: 2024-11-11

## 2024-11-11 RX ORDER — CYCLOBENZAPRINE HCL 10 MG
10 TABLET ORAL 3 TIMES DAILY PRN
Status: DISCONTINUED | OUTPATIENT
Start: 2024-11-11 | End: 2024-11-12 | Stop reason: HOSPADM

## 2024-11-11 RX ORDER — MIDAZOLAM HYDROCHLORIDE 1 MG/ML
1 INJECTION, SOLUTION INTRAMUSCULAR; INTRAVENOUS ONCE AS NEEDED
Status: COMPLETED | OUTPATIENT
Start: 2024-11-11 | End: 2024-11-11

## 2024-11-11 RX ORDER — GLYCOPYRROLATE 0.2 MG/ML
INJECTION INTRAMUSCULAR; INTRAVENOUS AS NEEDED
Status: DISCONTINUED | OUTPATIENT
Start: 2024-11-11 | End: 2024-11-11

## 2024-11-11 RX ORDER — LIDOCAINE HYDROCHLORIDE 20 MG/ML
INJECTION, SOLUTION EPIDURAL; INFILTRATION; INTRACAUDAL; PERINEURAL AS NEEDED
Status: DISCONTINUED | OUTPATIENT
Start: 2024-11-11 | End: 2024-11-11

## 2024-11-11 RX ORDER — NORETHINDRONE AND ETHINYL ESTRADIOL 0.5-0.035
KIT ORAL AS NEEDED
Status: DISCONTINUED | OUTPATIENT
Start: 2024-11-11 | End: 2024-11-11

## 2024-11-11 RX ORDER — POLYETHYLENE GLYCOL 3350 17 G/17G
17 POWDER, FOR SOLUTION ORAL DAILY
Status: DISCONTINUED | OUTPATIENT
Start: 2024-11-11 | End: 2024-11-12 | Stop reason: HOSPADM

## 2024-11-11 RX ORDER — ONDANSETRON HYDROCHLORIDE 2 MG/ML
4 INJECTION, SOLUTION INTRAVENOUS EVERY 8 HOURS PRN
Status: DISCONTINUED | OUTPATIENT
Start: 2024-11-11 | End: 2024-11-12 | Stop reason: HOSPADM

## 2024-11-11 RX ORDER — DEXTROSE 50 % IN WATER (D50W) INTRAVENOUS SYRINGE
25
Status: DISCONTINUED | OUTPATIENT
Start: 2024-11-11 | End: 2024-11-12 | Stop reason: HOSPADM

## 2024-11-11 RX ORDER — MORPHINE SULFATE 2 MG/ML
2 INJECTION, SOLUTION INTRAMUSCULAR; INTRAVENOUS EVERY 2 HOUR PRN
Status: DISCONTINUED | OUTPATIENT
Start: 2024-11-11 | End: 2024-11-12 | Stop reason: HOSPADM

## 2024-11-11 RX ORDER — HYDROCODONE BITARTRATE AND ACETAMINOPHEN 5; 325 MG/1; MG/1
1 TABLET ORAL EVERY 4 HOURS PRN
Status: DISCONTINUED | OUTPATIENT
Start: 2024-11-11 | End: 2024-11-11 | Stop reason: HOSPADM

## 2024-11-11 RX ORDER — FENTANYL CITRATE 50 UG/ML
INJECTION, SOLUTION INTRAMUSCULAR; INTRAVENOUS AS NEEDED
Status: DISCONTINUED | OUTPATIENT
Start: 2024-11-11 | End: 2024-11-11

## 2024-11-11 RX ORDER — CEFAZOLIN SODIUM 2 G/100ML
INJECTION, SOLUTION INTRAVENOUS AS NEEDED
Status: DISCONTINUED | OUTPATIENT
Start: 2024-11-11 | End: 2024-11-11

## 2024-11-11 RX ORDER — DIPHENHYDRAMINE HYDROCHLORIDE 50 MG/ML
12.5 INJECTION INTRAMUSCULAR; INTRAVENOUS ONCE AS NEEDED
Status: DISCONTINUED | OUTPATIENT
Start: 2024-11-11 | End: 2024-11-11 | Stop reason: HOSPADM

## 2024-11-11 RX ORDER — HYDRALAZINE HYDROCHLORIDE 20 MG/ML
5 INJECTION INTRAMUSCULAR; INTRAVENOUS EVERY 30 MIN PRN
Status: DISCONTINUED | OUTPATIENT
Start: 2024-11-11 | End: 2024-11-11 | Stop reason: HOSPADM

## 2024-11-11 RX ORDER — OXYCODONE HYDROCHLORIDE 5 MG/1
5 TABLET ORAL EVERY 4 HOURS PRN
Status: DISCONTINUED | OUTPATIENT
Start: 2024-11-11 | End: 2024-11-12 | Stop reason: HOSPADM

## 2024-11-11 RX ORDER — OXYCODONE HYDROCHLORIDE 5 MG/1
2.5 TABLET ORAL EVERY 4 HOURS PRN
Status: DISCONTINUED | OUTPATIENT
Start: 2024-11-11 | End: 2024-11-12 | Stop reason: HOSPADM

## 2024-11-11 RX ORDER — PHENYLEPHRINE HCL IN 0.9% NACL 1 MG/10 ML
SYRINGE (ML) INTRAVENOUS AS NEEDED
Status: DISCONTINUED | OUTPATIENT
Start: 2024-11-11 | End: 2024-11-11

## 2024-11-11 RX ORDER — ALBUTEROL SULFATE 0.83 MG/ML
2.5 SOLUTION RESPIRATORY (INHALATION)
Status: DISCONTINUED | OUTPATIENT
Start: 2024-11-11 | End: 2024-11-11 | Stop reason: HOSPADM

## 2024-11-11 RX ORDER — LABETALOL HYDROCHLORIDE 5 MG/ML
5 INJECTION, SOLUTION INTRAVENOUS
Status: DISCONTINUED | OUTPATIENT
Start: 2024-11-11 | End: 2024-11-11 | Stop reason: HOSPADM

## 2024-11-11 RX ORDER — CEFAZOLIN SODIUM 2 G/100ML
2 INJECTION, SOLUTION INTRAVENOUS EVERY 8 HOURS
Status: COMPLETED | OUTPATIENT
Start: 2024-11-11 | End: 2024-11-12

## 2024-11-11 RX ORDER — DEXAMETHASONE SODIUM PHOSPHATE 10 MG/ML
INJECTION INTRAMUSCULAR; INTRAVENOUS AS NEEDED
Status: DISCONTINUED | OUTPATIENT
Start: 2024-11-11 | End: 2024-11-11

## 2024-11-11 RX ORDER — ROCURONIUM BROMIDE 50 MG/5 ML
SYRINGE (ML) INTRAVENOUS AS NEEDED
Status: DISCONTINUED | OUTPATIENT
Start: 2024-11-11 | End: 2024-11-11

## 2024-11-11 RX ORDER — NEOSTIGMINE METHYLSULFATE 1 MG/ML
INJECTION INTRAVENOUS AS NEEDED
Status: DISCONTINUED | OUTPATIENT
Start: 2024-11-11 | End: 2024-11-11

## 2024-11-11 RX ORDER — SUCCINYLCHOLINE CHLORIDE 100 MG/5ML
SYRINGE (ML) INTRAVENOUS AS NEEDED
Status: DISCONTINUED | OUTPATIENT
Start: 2024-11-11 | End: 2024-11-11

## 2024-11-11 RX ORDER — DEXTROSE 50 % IN WATER (D50W) INTRAVENOUS SYRINGE
12.5
Status: DISCONTINUED | OUTPATIENT
Start: 2024-11-11 | End: 2024-11-12 | Stop reason: HOSPADM

## 2024-11-11 RX ORDER — ALPRAZOLAM 0.5 MG/1
1 TABLET ORAL NIGHTLY PRN
Status: DISCONTINUED | OUTPATIENT
Start: 2024-11-11 | End: 2024-11-12 | Stop reason: HOSPADM

## 2024-11-11 RX ORDER — PROPOFOL 10 MG/ML
INJECTION, EMULSION INTRAVENOUS AS NEEDED
Status: DISCONTINUED | OUTPATIENT
Start: 2024-11-11 | End: 2024-11-11

## 2024-11-11 RX ORDER — HYDROMORPHONE HYDROCHLORIDE 1 MG/ML
1 INJECTION, SOLUTION INTRAMUSCULAR; INTRAVENOUS; SUBCUTANEOUS EVERY 5 MIN PRN
Status: DISCONTINUED | OUTPATIENT
Start: 2024-11-11 | End: 2024-11-11 | Stop reason: HOSPADM

## 2024-11-11 RX ORDER — TRANEXAMIC ACID 650 MG/1
1950 TABLET ORAL ONCE
Status: COMPLETED | OUTPATIENT
Start: 2024-11-11 | End: 2024-11-11

## 2024-11-11 RX ORDER — NALOXONE HYDROCHLORIDE 0.4 MG/ML
0.2 INJECTION, SOLUTION INTRAMUSCULAR; INTRAVENOUS; SUBCUTANEOUS EVERY 5 MIN PRN
Status: DISCONTINUED | OUTPATIENT
Start: 2024-11-11 | End: 2024-11-12 | Stop reason: HOSPADM

## 2024-11-11 RX ORDER — ACETAMINOPHEN 325 MG/1
650 TABLET ORAL EVERY 6 HOURS
Status: DISCONTINUED | OUTPATIENT
Start: 2024-11-11 | End: 2024-11-12 | Stop reason: HOSPADM

## 2024-11-11 RX ORDER — MIDAZOLAM HYDROCHLORIDE 1 MG/ML
INJECTION, SOLUTION INTRAMUSCULAR; INTRAVENOUS AS NEEDED
Status: DISCONTINUED | OUTPATIENT
Start: 2024-11-11 | End: 2024-11-11

## 2024-11-11 RX ORDER — ONDANSETRON 4 MG/1
4 TABLET, FILM COATED ORAL EVERY 8 HOURS PRN
Status: DISCONTINUED | OUTPATIENT
Start: 2024-11-11 | End: 2024-11-12 | Stop reason: HOSPADM

## 2024-11-11 RX ORDER — SODIUM CHLORIDE, SODIUM LACTATE, POTASSIUM CHLORIDE, CALCIUM CHLORIDE 600; 310; 30; 20 MG/100ML; MG/100ML; MG/100ML; MG/100ML
100 INJECTION, SOLUTION INTRAVENOUS CONTINUOUS
Status: DISCONTINUED | OUTPATIENT
Start: 2024-11-11 | End: 2024-11-12 | Stop reason: HOSPADM

## 2024-11-11 RX ORDER — SODIUM CHLORIDE 9 MG/ML
100 INJECTION, SOLUTION INTRAVENOUS CONTINUOUS
Status: ACTIVE | OUTPATIENT
Start: 2024-11-11 | End: 2024-11-12

## 2024-11-11 SDOH — ECONOMIC STABILITY: HOUSING INSECURITY: IN THE LAST 12 MONTHS, WAS THERE A TIME WHEN YOU WERE NOT ABLE TO PAY THE MORTGAGE OR RENT ON TIME?: NO

## 2024-11-11 SDOH — SOCIAL STABILITY: SOCIAL INSECURITY
WITHIN THE LAST YEAR, HAVE YOU BEEN RAPED OR FORCED TO HAVE ANY KIND OF SEXUAL ACTIVITY BY YOUR PARTNER OR EX-PARTNER?: NO

## 2024-11-11 SDOH — SOCIAL STABILITY: SOCIAL INSECURITY: WITHIN THE LAST YEAR, HAVE YOU BEEN HUMILIATED OR EMOTIONALLY ABUSED IN OTHER WAYS BY YOUR PARTNER OR EX-PARTNER?: NO

## 2024-11-11 SDOH — ECONOMIC STABILITY: TRANSPORTATION INSECURITY: IN THE PAST 12 MONTHS, HAS LACK OF TRANSPORTATION KEPT YOU FROM MEDICAL APPOINTMENTS OR FROM GETTING MEDICATIONS?: NO

## 2024-11-11 SDOH — SOCIAL STABILITY: SOCIAL INSECURITY: DO YOU FEEL ANYONE HAS EXPLOITED OR TAKEN ADVANTAGE OF YOU FINANCIALLY OR OF YOUR PERSONAL PROPERTY?: NO

## 2024-11-11 SDOH — ECONOMIC STABILITY: FOOD INSECURITY: WITHIN THE PAST 12 MONTHS, THE FOOD YOU BOUGHT JUST DIDN'T LAST AND YOU DIDN'T HAVE MONEY TO GET MORE.: NEVER TRUE

## 2024-11-11 SDOH — ECONOMIC STABILITY: INCOME INSECURITY: IN THE PAST 12 MONTHS HAS THE ELECTRIC, GAS, OIL, OR WATER COMPANY THREATENED TO SHUT OFF SERVICES IN YOUR HOME?: NO

## 2024-11-11 SDOH — ECONOMIC STABILITY: FOOD INSECURITY: HOW HARD IS IT FOR YOU TO PAY FOR THE VERY BASICS LIKE FOOD, HOUSING, MEDICAL CARE, AND HEATING?: NOT VERY HARD

## 2024-11-11 SDOH — SOCIAL STABILITY: SOCIAL INSECURITY: HAVE YOU HAD THOUGHTS OF HARMING ANYONE ELSE?: NO

## 2024-11-11 SDOH — SOCIAL STABILITY: SOCIAL INSECURITY
WITHIN THE LAST YEAR, HAVE YOU BEEN KICKED, HIT, SLAPPED, OR OTHERWISE PHYSICALLY HURT BY YOUR PARTNER OR EX-PARTNER?: NO

## 2024-11-11 SDOH — SOCIAL STABILITY: SOCIAL INSECURITY: ARE YOU OR HAVE YOU BEEN THREATENED OR ABUSED PHYSICALLY, EMOTIONALLY, OR SEXUALLY BY ANYONE?: NO

## 2024-11-11 SDOH — ECONOMIC STABILITY: FOOD INSECURITY: WITHIN THE PAST 12 MONTHS, YOU WORRIED THAT YOUR FOOD WOULD RUN OUT BEFORE YOU GOT THE MONEY TO BUY MORE.: NEVER TRUE

## 2024-11-11 SDOH — SOCIAL STABILITY: SOCIAL INSECURITY: WITHIN THE LAST YEAR, HAVE YOU BEEN AFRAID OF YOUR PARTNER OR EX-PARTNER?: NO

## 2024-11-11 SDOH — ECONOMIC STABILITY: HOUSING INSECURITY: AT ANY TIME IN THE PAST 12 MONTHS, WERE YOU HOMELESS OR LIVING IN A SHELTER (INCLUDING NOW)?: NO

## 2024-11-11 SDOH — SOCIAL STABILITY: SOCIAL INSECURITY: ABUSE: ADULT

## 2024-11-11 SDOH — SOCIAL STABILITY: SOCIAL INSECURITY: ARE THERE ANY APPARENT SIGNS OF INJURIES/BEHAVIORS THAT COULD BE RELATED TO ABUSE/NEGLECT?: NO

## 2024-11-11 SDOH — SOCIAL STABILITY: SOCIAL INSECURITY: DO YOU FEEL UNSAFE GOING BACK TO THE PLACE WHERE YOU ARE LIVING?: NO

## 2024-11-11 SDOH — SOCIAL STABILITY: SOCIAL INSECURITY: DOES ANYONE TRY TO KEEP YOU FROM HAVING/CONTACTING OTHER FRIENDS OR DOING THINGS OUTSIDE YOUR HOME?: NO

## 2024-11-11 SDOH — HEALTH STABILITY: MENTAL HEALTH: CURRENT SMOKER: 0

## 2024-11-11 SDOH — SOCIAL STABILITY: SOCIAL INSECURITY: HAS ANYONE EVER THREATENED TO HURT YOUR FAMILY OR YOUR PETS?: NO

## 2024-11-11 SDOH — SOCIAL STABILITY: SOCIAL INSECURITY: WERE YOU ABLE TO COMPLETE ALL THE BEHAVIORAL HEALTH SCREENINGS?: YES

## 2024-11-11 SDOH — SOCIAL STABILITY: SOCIAL INSECURITY: HAVE YOU HAD ANY THOUGHTS OF HARMING ANYONE ELSE?: NO

## 2024-11-11 SDOH — ECONOMIC STABILITY: HOUSING INSECURITY: IN THE PAST 12 MONTHS, HOW MANY TIMES HAVE YOU MOVED WHERE YOU WERE LIVING?: 0

## 2024-11-11 ASSESSMENT — COGNITIVE AND FUNCTIONAL STATUS - GENERAL
DAILY ACTIVITIY SCORE: 23
MOVING FROM LYING ON BACK TO SITTING ON SIDE OF FLAT BED WITH BEDRAILS: A LITTLE
WALKING IN HOSPITAL ROOM: A LITTLE
MOVING TO AND FROM BED TO CHAIR: A LITTLE
CLIMB 3 TO 5 STEPS WITH RAILING: A LITTLE
STANDING UP FROM CHAIR USING ARMS: A LITTLE
WALKING IN HOSPITAL ROOM: A LITTLE
CLIMB 3 TO 5 STEPS WITH RAILING: A LITTLE
DAILY ACTIVITIY SCORE: 23
DRESSING REGULAR LOWER BODY CLOTHING: A LITTLE
STANDING UP FROM CHAIR USING ARMS: A LITTLE
PATIENT BASELINE BEDBOUND: NO
DRESSING REGULAR LOWER BODY CLOTHING: A LITTLE
MOBILITY SCORE: 18
MOVING FROM LYING ON BACK TO SITTING ON SIDE OF FLAT BED WITH BEDRAILS: A LITTLE
TURNING FROM BACK TO SIDE WHILE IN FLAT BAD: A LITTLE
TURNING FROM BACK TO SIDE WHILE IN FLAT BAD: A LITTLE
MOVING TO AND FROM BED TO CHAIR: A LITTLE
MOBILITY SCORE: 18

## 2024-11-11 ASSESSMENT — PAIN SCALES - GENERAL
PAINLEVEL_OUTOF10: 7
PAINLEVEL_OUTOF10: 2
PAINLEVEL_OUTOF10: 5 - MODERATE PAIN
PAINLEVEL_OUTOF10: 6
PAINLEVEL_OUTOF10: 6
PAINLEVEL_OUTOF10: 7
PAINLEVEL_OUTOF10: 8
PAINLEVEL_OUTOF10: 6
PAINLEVEL_OUTOF10: 6
PAINLEVEL_OUTOF10: 8
PAINLEVEL_OUTOF10: 5 - MODERATE PAIN
PAINLEVEL_OUTOF10: 6
PAINLEVEL_OUTOF10: 8
PAINLEVEL_OUTOF10: 0 - NO PAIN
PAINLEVEL_OUTOF10: 6
PAINLEVEL_OUTOF10: 5 - MODERATE PAIN

## 2024-11-11 ASSESSMENT — PAIN DESCRIPTION - DESCRIPTORS
DESCRIPTORS: ACHING;SHARP;SORE
DESCRIPTORS: SHOOTING;STABBING
DESCRIPTORS: ACHING;SHARP;SHOOTING

## 2024-11-11 ASSESSMENT — PAIN - FUNCTIONAL ASSESSMENT

## 2024-11-11 ASSESSMENT — COLUMBIA-SUICIDE SEVERITY RATING SCALE - C-SSRS
1. IN THE PAST MONTH, HAVE YOU WISHED YOU WERE DEAD OR WISHED YOU COULD GO TO SLEEP AND NOT WAKE UP?: NO
2. HAVE YOU ACTUALLY HAD ANY THOUGHTS OF KILLING YOURSELF?: NO
2. HAVE YOU ACTUALLY HAD ANY THOUGHTS OF KILLING YOURSELF?: NO
6. HAVE YOU EVER DONE ANYTHING, STARTED TO DO ANYTHING, OR PREPARED TO DO ANYTHING TO END YOUR LIFE?: NO
1. IN THE PAST MONTH, HAVE YOU WISHED YOU WERE DEAD OR WISHED YOU COULD GO TO SLEEP AND NOT WAKE UP?: NO
6. HAVE YOU EVER DONE ANYTHING, STARTED TO DO ANYTHING, OR PREPARED TO DO ANYTHING TO END YOUR LIFE?: NO

## 2024-11-11 ASSESSMENT — ACTIVITIES OF DAILY LIVING (ADL)
HEARING - LEFT EAR: FUNCTIONAL
DRESSING YOURSELF: INDEPENDENT
FEEDING YOURSELF: INDEPENDENT
JUDGMENT_ADEQUATE_SAFELY_COMPLETE_DAILY_ACTIVITIES: YES
BATHING: INDEPENDENT
LACK_OF_TRANSPORTATION: NO
WALKS IN HOME: INDEPENDENT
GROOMING: INDEPENDENT
HEARING - RIGHT EAR: FUNCTIONAL
PATIENT'S MEMORY ADEQUATE TO SAFELY COMPLETE DAILY ACTIVITIES?: YES
LACK_OF_TRANSPORTATION: NO
ADEQUATE_TO_COMPLETE_ADL: YES
TOILETING: INDEPENDENT

## 2024-11-11 ASSESSMENT — LIFESTYLE VARIABLES
HOW MANY STANDARD DRINKS CONTAINING ALCOHOL DO YOU HAVE ON A TYPICAL DAY: 1 OR 2
HOW OFTEN DO YOU HAVE A DRINK CONTAINING ALCOHOL: 2-3 TIMES A WEEK
SKIP TO QUESTIONS 9-10: 0
AUDIT-C TOTAL SCORE: 4
AUDIT-C TOTAL SCORE: 4
HOW OFTEN DO YOU HAVE 6 OR MORE DRINKS ON ONE OCCASION: LESS THAN MONTHLY

## 2024-11-11 ASSESSMENT — PATIENT HEALTH QUESTIONNAIRE - PHQ9
2. FEELING DOWN, DEPRESSED OR HOPELESS: NOT AT ALL
SUM OF ALL RESPONSES TO PHQ9 QUESTIONS 1 & 2: 0
1. LITTLE INTEREST OR PLEASURE IN DOING THINGS: NOT AT ALL

## 2024-11-11 ASSESSMENT — PAIN DESCRIPTION - LOCATION
LOCATION: BACK
LOCATION: BACK

## 2024-11-11 NOTE — ANESTHESIA PROCEDURE NOTES
Peripheral IV  Date/Time: 11/11/2024 8:00 AM  Inserted by: NATE Carrillo    Placement  Needle size: 16 G  Laterality: left  Location: hand  Local anesthetic: none  Site prep: alcohol  Technique: anatomical landmarks  Attempts: 1

## 2024-11-11 NOTE — OP NOTE
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  L4-5 spondylolisthesis.  Lumbar spondylosis.    Postop diagnosis: Same    Procedure: 1) 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  L4-5 laminectomy.  2) L4-5 posterior lumbar interbody fusion  3) L4-5 posterior lateral fusion 4) L4-5 instrumentation 5) L4-5 interbody cage  6) Thermal ablation of the medial nerve branch supplying the facets at L4-5 on the left.                 Surgeon: Dontrell Nelson M.D.    Asst.: Jones FLETCHER The physician assistant was present through the entire case. Given the nature of the disease process and the procedure to be performed a skilled surgical assistant was necessary during the case. The assistant was necessary in order to hold retractors and directly assist in the operation. A certified scrub tech was at the back table managing instruments and supplies for the surgical case.    Anesthesia: General    Blood Loss: 100 cc    Complications: None    HPI: The patient had pain from the above-described condition. The patient failed all nonoperative treatment. All of the risks, benefits, and potential complications for operative and nonoperative treatment were discussed at length with the patient. Risks of operative intervention include, but are not limited to: Anesthesia complications, excessive blood loss, infection, damaged to uninjured structures including, but not limited to, the dural sac and nerve roots, blood clots, and lack of improvement or worsening of symptoms. These complications could result in death, permanent disability, or need for reoperation. The patient completely understood those risks and wished to proceed with operative intervention.    Operative implants: Nuvasive Reline screws, Globus Sable cage size 9 mm high 26 mm long 10 mm wide, Actifuse gun bone graft.    Operative procedure: The patient was wheeled from the preanesthesia care unit to the  theater. The patient was placed in supine position and all bony prominences were well-padded. For the entire procedure anesthesia maintainined control of the patient's head neck. General anesthesia was induced. The patient was then placed prone on the Rupesh table. All bony prominences were well-padded. X-rays were then taken to confirm appropriate visualization of the operative level in AP and lateral projections.. Usingl fluoroscopic imaging the cephalad and caudal pedicles were marked on the patient's skin. The incisions were then marked. Next, the back was prepped and draped in normal sterile fashion.    Timeout was performed, site verification was verified, and appropriate antibiotic administration was confirmed. A longitudinal incision was then performed off to the left side of the marke pedicles. Dissection was carried down through the skin with a knife and Bovie was used to dissect down  the fat and the fascia. Blunt dissection was taken down to the transverse processes and facet on the left side. Next, the identical procedure was performed on the right side.    Using AP and lateral fluoroscopic imaging and Pediguard, an and intrapedicular approach was performed.  Using fluoroscopic imaging to to ensure that there was no breech of the pedicle, the trocar was brought down just through the posterior vertebral body wall of the right cephalad pedicle.  This was done by starting at the 3 o'clock position. The guidewire was docked into the vertebral body. Next the identical procedure was performed on the left  starting at the 9 o'clock position. In the identical procedure was then performed bilaterally at the caudal level for both pedicles. The wires were covered with suction tubing and appropriately stored. Appropriate pedicle guard readings were obtained for all 4 wires.    Using lateral x-ray, dilators were placed on the patient's left side over the L4-5 level facet and lamina. Dilators were dilated up to a  size 22 and a permanent 26 mm tube was docked into appropriate position visualized on AP and lateral projections. The microscope was then brought in for use. Bovie was used to remove a small amount of fat and muscle overlying the facet and lamina.  A Bovie was then used to perform thermal ablation at that level using direct visualization to confirm the thermal ablation was performed in the area of the medial sensory nerve branch to that facet.  A bur was then used to debulk the lamina as well as the facet.  A curved curet was used to enter under the lamina.  2 and 3 Kerrisons were used to perform a laminectomy taking off the ligamentum flavum and decompressing the patient's lateral shoulder of the dural sac, the exiting nerve root, and the traversing nerve root.  A facet cyst was encountered on the lateral edge of the dural sac and compressing the L5 nerve root as it was swinging around the L5 pedicle.  A distinct and separate approach and procedure were needed to remove the cyst as it headed distally in an area that would not have normally been exposed for central decompression and placement of interbody cage.  The cyst was removed in its entirety and sent to pathology.  Next, wanding the tube sequentially across midline a laminectomy was performed completely decompressing centrally as well as the contralateral exiting and traversing nerve roots.  This central and contralateral decompression was a distinct and separate procedure as was needed to place the interbody cage as this was performed on the patient's right side through a separate approach.  At this point there was complete decompression of the exiting and traversing nerve roots bilaterally as well as centrally.  The dural sac and nerve roots were pulsating freely    A laminectomy was then widened laterally in order to be able to provide room to place the interbody cage.  3 Kerrisons and a bur was used to take down the facet completely and laterally to see  the exiting nerve root and its far lateral region.  A nerve root retractor was used to gently retract and protect the dural sac and nerve roots.  This nicely exposed the disc. Bipolar cautery and FloSeal was used to maintain hemostasis. The disc was box cut and the annulus was removed with the pituitary rongeur. Pituitaries were then used to remove the nucleus inside the disc. Rotating santino were then used. Curved curettes were used to the right into the left to remove disc off of the endplate all the way to the contralateral side and down to the annulus anteriorly. Upbiters and backbiters were used to remove disc as well. At this point there was complete disc removal down to the endplates and the annulus.  The disc was irrigated copiously and suctioned free of all debris.    The Actifuse gun was then placed the disc space the disc space was filled with graft.  The cage was then placed down into the disc space visualized in AP and lateral projections to be appropriately positioned. It was expanded until appropriate tension was achieved and disc height restoration was achieved as visualized on AP and lateral fluoroscopic views. Using direct visualization, the insertion handle was removed and AP lateral x-rays confirmed appropriate positioning of the cage. Using the microscope for visualization a long ball was swung underneath the exiting nerve root and dural sac and traversing nerve root to ensure there was no impingement from extruded disc or bone graft.  Valsalva was performed was no evidence of any dural leak or excessive bleeding. Hemostasis was maintained for the entire case using FloSeal, however no FloSeal was left in the patient. The wound was irrigated copiously and the tube was removed under direct visualization.    Screws were then placed into all 4 pedicles at the L4-5 level by first measuring the length of the screw, tapping, using spinal cord monitoring to measure off the tap and obtaining appropriate  reading, and then placing the screw. Wires were removed after the screw was into the vertebral body but not completely seated. At this point all 4 screws were placed and the rods were passed using the passing gun. The rods were locked into position using the locking caps which were torqued appropriately. Insertion handles were removed and final AP lateral x-rays of the entire construct confirmed appropriate positioning of the screws, rods and cage.  Next, the Actifuse gun was used to place bone graft from L4 transverse process to the L5 transverse process.  Morselized bone graft that was prepared from local lamina and facet was also placed in the lateral gutter.  This bone graft completely filled the posterior lateral gutter to provide a nice fusion from transverse process to transverse process along the lateral portion of the facets.      The fascia for both incisions was closed with a running 0 Vicryl suture. The fatty layer was closed with 0 Vicryl the skin was closed with 2-0 Vicryl, 4-0 Monocryl, and Steri-Strips. A sterile dressing was applied. At the conclusion of the case the patient's toes were pink and warm with brisk capillary refill. The patient tolerated the procedure well. There were no EMG changes for the entire case. Appropriate spinal cord monitor readings were obtained for all pedicle screws.          This dictation was created using voice recognition software. If there are any questions about inaccuracies please do not hesitate to contact me.

## 2024-11-11 NOTE — DISCHARGE INSTR - ACTIVITY
Call Dr. Nelson for any problems and/or concerns.  *Maintain spine precautions  *Log roll as instructed  *Walk as much as possible  *Avoid pushing, pulling, bending, twisting, and sitting/laying down in the same position for long periods of time  *No baths, hot tubs, or pools until cleared by physician; no lotions, creams, ointments over incision  *You may shower, do not soak or scrub incision; pat dry  *Continue the coughing and deep breathing exercises that you learned in the hospital  *Do not engage in sports, heavy work or lifting  *If you have a brace/collar-wear as instructed by physician and/or therapy, keep the brace/collar clean and dry, check the skin around the brace/collar every day and notify your physician of any concerns    Call Doctor right away for:  *Fever above 100.4/shaking chills  *New pain, weakness, warmth, or numbness in your legs  *Foot, ankle, or calf swelling that is not relieved by elevating your feet  *Inability to urinate/move bowels  *A severe headache  *Chest pain/shortness of breath-call 911  *Difficulty swallowing (cervical surgery)    If your doctor has ordered compression stockings, wear as directed as they help to prevent blood clots and reduce swelling in your legs    Do not use any products that contain nicotine or tobacco, such as cigarettes, e-cigarettes, and chewing tobacco. These can delay bone healing after surgery. If you need help quitting, ask your healthcare provider    -No heavy lifting or straining until patient is seen in the office.  -Encourage ambulation at least 3 times a day.  -No formal physical therapy until ordered by Dr. Nelson.  -Follow-up in the office in 2 weeks.  Appointment should already be made.  -You must be accompanied by a responsible adult upon discharge and for 24 hours after surgery.  Do not drive a motor vehicle, operate machinery, power tools or appliances, drink alcoholic beverages, or make critical decisions for 24 hours and while on  narcotic pain meds.  -Be aware of dizziness, which may cause a fall.  Change positions slowly.  -You may resume your regular diet, but do so slowly.  -Use your pain medication prescription as prescribed by your physician.  If possible, take your medication with food, and drink plenty of fluids.  -Call your surgeon if you have questions, temperature of 101° or greater, increased bleeding swelling or pain, drainage from the incision or increased redness around the incision.  -Call [phone] with any questions or concerns.

## 2024-11-11 NOTE — ANESTHESIA PREPROCEDURE EVALUATION
"Patient: Dao Becker \"Benigno\"    Procedure Information       Date/Time: 11/11/24 0730    Procedure: L4-5 LAMINECTOMY + INSTRUMENTATION + POSTERIOR INTERBODY FUSION + POSTERIOR LATERAL FUSION (Spine Lumbar)    Location: [facility] / [facility]    Surgeons: Dontrell Nelson MD            Relevant Problems   Cardiac   (+) Abnormal EKG   (+) Cardiac murmur   (+) Chest pain   (+) Coronary artery disease involving native coronary artery of native heart   (+) Familial hypercholesterolemia   (+) Nonrheumatic aortic valve stenosis      Pulmonary   (+) Dyspnea on exertion   (+) DIANE (obstructive sleep apnea)   (+) Obstructive sleep apnea syndrome, severe      Neuro   (+) MONTRELL (generalized anxiety disorder)      Musculoskeletal   (+) Cervical spinal stenosis   (+) Neck pain, chronic   (+) Spinal stenosis, lumbar region without neurogenic claudication      HEENT   (+) Acute sinus infection       Clinical information reviewed:   Tobacco  Allergies  Meds   Med Hx  Surg Hx   Fam Hx  Soc Hx        NPO Detail:  NPO/Void Status  Carbohydrate Drink Given Prior to Surgery? : N  Date of Last Liquid: 11/11/24  Time of Last Liquid: 0430  Date of Last Solid: 11/10/24  Time of Last Solid: 2200  Last Intake Type: Clear fluids  Time of Last Void: 0628         Physical Exam    Airway  Mallampati: III  TM distance: >3 FB  Neck ROM: full     Cardiovascular - normal exam     Dental - normal exam     Pulmonary - normal exam     Abdominal - normal exam           Vitals:    11/11/24 0626   BP: 130/69   Pulse: 67   Resp: 18   Temp: 36.9 °C (98.4 °F)   SpO2: 97%       Past Surgical History:   Procedure Laterality Date    COLONOSCOPY      HERNIA REPAIR      KNEE ARTHROSCOPY W/ LASER Bilateral     LASIK       Past Medical History:   Diagnosis Date    ADD (attention deficit disorder)     Anxiety     Arthritis     Carotid bruit     bilateral    Chronic kidney disease     Coronary artery disease     Dizziness     Hiatal hernia     History of " continuous positive airway pressure (CPAP) therapy     HL (hearing loss)     Hyperlipidemia     Joint pain     Lumbar disc disease     Sleep apnea     Syncope     2019 no explanation for episode     No current facility-administered medications for this encounter.    Facility-Administered Medications Ordered in Other Encounters:     aminophylline syringe 125 mg 5 mL, 125 mg, intravenous, Once PRN, Isidoro Crews MD  Prior to Admission medications    Medication Sig Start Date End Date Taking? Authorizing Provider   ALPRAZolam (Xanax) 1 mg tablet Take 1 tablet (1 mg) by mouth as needed at bedtime.   Yes Historical Provider, MD   aspirin 81 mg EC tablet Take 1 tablet (81 mg) by mouth once daily. 7/24/24 7/24/25 Yes Isidoro Crews MD   chlorhexidine (Peridex) 0.12 % solution 15 milliliter(s) orally once a day for 2 doses 15 ml  the night before surgery and 15 ml morning of surgery - swish for 30 seconds -DO NOT SWALLOW, SPIT OUT 10/28/24  Yes Avinash Astudillo, APRN-CNP   doxycycline (Monodox) 100 mg capsule Take 1 capsule (100 mg) by mouth once daily.   Yes Historical Provider, MD   tadalafil (Cialis) 5 mg tablet Take 1 tablet (5 mg) by mouth once daily at bedtime.   Yes Historical Provider, MD   tiZANidine (Zanaflex) 4 mg capsule Take 1 capsule (4 mg) by mouth 3 times a day as needed for muscle spasms.   Yes Historical Provider, MD   traMADol (Ultram) 50 mg tablet Take 1 tablet (50 mg) by mouth every 8 hours if needed for severe pain (7 - 10).   Yes Historical Provider, MD   evolocumab (Repatha SureClick) 140 mg/mL injection Inject 1 mL (140 mg) under the skin every 14 (fourteen) days.  Patient not taking: Reported on 10/26/2024 7/24/24 7/24/25  Isidoro Crews MD   ezetimibe (Zetia) 10 mg tablet Take 1 tablet (10 mg) by mouth once daily.  Patient not taking: Reported on 10/26/2024 9/23/24   Isidoro Crews MD   metoprolol succinate XL (Toprol-XL) 25 mg 24 hr tablet Take 1 tablet (25 mg) by mouth  once daily.  Patient not taking: Reported on 10/26/2024 9/23/24   Isidoro Crews MD     No Known Allergies  Social History     Tobacco Use    Smoking status: Former     Types: Cigarettes    Smokeless tobacco: Never   Substance Use Topics    Alcohol use: Yes     Comment: on occ         Chemistry    Lab Results   Component Value Date/Time     10/10/2024 1519    K 3.8 10/10/2024 1519     10/10/2024 1519    CO2 30 10/10/2024 1519    BUN 21 10/10/2024 1519    CREATININE 1.38 (H) 10/10/2024 1519    Lab Results   Component Value Date/Time    CALCIUM 9.3 10/10/2024 1519    ALKPHOS 42 10/10/2024 1519    AST 17 10/10/2024 1519    ALT 21 10/10/2024 1519    BILITOT 0.5 10/10/2024 1519          Lab Results   Component Value Date/Time    WBC 7.8 10/10/2024 1519    HGB 15.7 10/10/2024 1519    HCT 48.2 10/10/2024 1519     10/10/2024 1519     Lab Results   Component Value Date/Time    PROTIME 11.7 10/28/2024 1443    INR 1.0 10/28/2024 1443     Encounter Date: 10/28/24   ECG 12 Lead   Result Value    Ventricular Rate 80    Atrial Rate 80    KY Interval 120    QRS Duration 88    QT Interval 344    QTC Calculation(Bazett) 396    P Axis 65    R Axis 75    T Axis 102    QRS Count 14    Q Onset 219    P Onset 159    P Offset 210    T Offset 391    QTC Fredericia 378    Narrative    Normal sinus rhythm  Possible Left atrial enlargement  Left ventricular hypertrophy  T wave abnormality, consider inferior ischemia  T wave abnormality, consider anterolateral ischemia  Abnormal ECG  No previous ECGs available  Confirmed by Sawyer Avila ([id number]) on 11/1/2024 4:14:23 PM        Anesthesia Plan    History of general anesthesia?: yes  History of complications of general anesthesia?: no    ASA 2     general     The patient is not a current smoker.    intravenous induction   Anesthetic plan and risks discussed with patient and spouse.    Plan discussed with [facility].

## 2024-11-11 NOTE — CONSULTS
Inpatient consult to Medicine  Consult performed by: Juan Reid MD  Consult ordered by: Jones Garcia PA-C  Reason for consult: Medical management          Reason For Consult  Medical management    History Of Present Illness  Benigno Becker is a 64 y.o. male with past medical history of insomnia/ADD, former smoker quit in 2014, chronic back and neck pain, CKD, CAD on aspirin most recent dose was on Saturday, hyperlipidemia presented for elective surgery with Dr. Nelson.  Patient tolerated the procedure well and had procedure done and was seen postoperatively on supplemental O2 and with a Carlin catheter in.  They are to do a procedure for tight foreskin and urology will be consulted for that.  Patient otherwise is fairly healthy and was exercising up until a year ago because of the back pain.  He was doing strength training 3 times a week.  He is compliant with his CPAP machine and uses it daily and brought it here.  He is also using medication to help him sleep for the diagnoses of anxiety/ADD which has been reordered     Past Medical History  He has a past medical history of ADD (attention deficit disorder), Anxiety, Arthritis, Carotid bruit, Chronic kidney disease, Coronary artery disease, Dizziness, Hiatal hernia, History of continuous positive airway pressure (CPAP) therapy, HL (hearing loss), Hyperlipidemia, Joint pain, Lumbar disc disease, Sleep apnea, and Syncope.    Surgical History  He has a past surgical history that includes Hernia repair; Colonoscopy; Knee arthroscopy w/ laser (Bilateral); and LASIK.     Social History  He reports that he has quit smoking. His smoking use included cigarettes. He has never used smokeless tobacco. He reports current alcohol use. He reports current drug use. Drug: Marijuana.    Family History  Family History   Problem Relation Name Age of Onset    Heart disease Father          Allergies  Patient has no known allergies.    Physical Exam:  General: Alert, not in acute  "distress on nasal cannula  HEENT: PERRLA, head intact and normocephalic  Neck: Normal to inspection  Lungs: Clear to auscultation, work of breathing within normal limit  Cardiac: Regular rate and rhythm  Abdomen: Soft nontender, positive bowel sounds  : Carlin catheter in place  Skin: Intact  Hematology: No petechia or excessive ecchymosis  Musculoskeletal: Without significant trauma  Neurological: Alert awake oriented, no focal deficit, cranial nerves grossly intact  Psych: No suicidal ideation or homicidal ideation    Relevant Results  Scheduled medications  acetaminophen, 650 mg, oral, q6h  ceFAZolin, 2 g, intravenous, q8h  oxygen, , inhalation, Continuous - 02/gases  polyethylene glycol, 17 g, oral, Daily      Continuous medications  lactated Ringer's, 100 mL/hr, Last Rate: Stopped (11/11/24 1059)  sodium chloride 0.9%, 100 mL/hr, Last Rate: 100 mL/hr (11/11/24 1409)      PRN medications  PRN medications: ALPRAZolam, cyclobenzaprine, dextrose, dextrose, glucagon, glucagon, morphine, naloxone, ondansetron **OR** ondansetron, oxyCODONE, oxyCODONE, oxyCODONE   Labs            No lab exists for component: \"LABALBU\"    FL fluoro images no charge    Result Date: 11/11/2024  These images are not reportable by radiology and will not be interpreted by  Radiologists.    ECG 12 Lead    Result Date: 11/1/2024  Normal sinus rhythm Possible Left atrial enlargement Left ventricular hypertrophy T wave abnormality, consider inferior ischemia T wave abnormality, consider anterolateral ischemia Abnormal ECG No previous ECGs available Confirmed by Sawyer Avila ([id number]) on 11/1/2024 4:14:23 PM          Assessment/Plan   Dao Becker \"Benigno\" is a 64 y.o. male on day 0 of admission presenting with Back pain with radiation.  Principal Problem:    Back pain with radiation  Active Problems:    Spinal stenosis, lumbar region without neurogenic claudication    Spondylolisthesis, lumbar region      Benigno Becker is a 64 y.o. male with " past medical history of insomnia/ADD, former smoker quit in 2014, chronic back and neck pain, CKD, CAD on aspirin most recent dose was on Saturday, hyperlipidemia presented for elective surgery with Dr. Nelson.      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  Postop day #0  Management as per orthopedic surgery  Wean O2 as tolerated  Education provided on incentive spirometry  Multimodality pain control  Primary service is consulting urology for tight foreskin requiring a procedure in OR  Aspirin is on hold and can resume once okay with orthopedic surgery    Insomnia/ADD  Patient uses Xanax as nighttime to help him sleep which has been reordered for patient as needed    DIANE  Use CPAP at nighttime    Other chronic issue  CAD  CKD  Hyperlipidemia  Carotid stenosis  Resume aspirin once once cleared by orthopedic surgery  Monitor routine labs    DVT prophylaxis  SCDs and ambulation       Plan discussed with patient at bedside.  Thank you for allowing us to participate in this patient's care.  Medically there is no active issues hence we will sign off.  Please call us or reconsult us if needed    Low level of MDM based on above issue and discussing plan    This note is created using voice recognition software. All efforts are made to minimize errors, if there are errors there due to transcription.    Juan Reid  Hospitalist

## 2024-11-11 NOTE — ANESTHESIA PROCEDURE NOTES
Airway  Date/Time: 11/11/2024 7:40 AM  Urgency: elective    Airway not difficult    Staffing  Performed: NATE   Authorized by: John Mendez MD    Performed by: NATE Carrillo  Patient location during procedure: OR    Indications and Patient Condition  Indications for airway management: anesthesia  Spontaneous Ventilation: absent  Sedation level: deep  Preoxygenated: yes  Patient position: sniffing  MILS maintained throughout  Mask difficulty assessment: 1 - vent by mask    Final Airway Details  Final airway type: endotracheal airway      Successful airway: ETT  Cuffed: yes   Successful intubation technique: direct laryngoscopy  Facilitating devices/methods: intubating stylet and cricoid pressure  Endotracheal tube insertion site: oral  Blade: Katalina  Blade size: #4  ETT size (mm): 7.5  Cormack-Lehane Classification: grade III - view of epiglottis only  Placement verified by: chest auscultation and capnometry   Cuff volume (mL): 6  Measured from: lips  ETT to lips (cm): 22  Number of attempts at approach: 1    Additional Comments  Lips and teeth in pre anesthetic conditions after laryngoscopy

## 2024-11-11 NOTE — ANESTHESIA POSTPROCEDURE EVALUATION
"Patient: Dao Becker \"Benigno\"    Procedure Summary       Date: 11/11/24 Room / Location: [facility] OR 06 / [facility]    Anesthesia Start: 0729 Anesthesia Stop: 1101    Procedure: L4-5 LAMINECTOMY + INSTRUMENTATION + POSTERIOR INTERBODY FUSION + POSTERIOR LATERAL FUSION (Spine Lumbar) Diagnosis:       Spinal stenosis, lumbar region without neurogenic claudication      Spondylolisthesis, lumbar region      (Spinal stenosis, lumbar region without neurogenic claudication [M48.061])      (Spondylolisthesis, lumbar region [M43.16])    Surgeons: Dontrell Nelson MD Responsible Provider: John Mendez MD    Anesthesia Type: general ASA Status: 2            Anesthesia Type: general    Vitals Value Taken Time   /76 11/11/24 1058   Temp 36.9 11/11/24 1101   Pulse 80 11/11/24 1100   Resp 10 11/11/24 1101   SpO2 97 % 11/11/24 1100   Vitals shown include unfiled device data.    Anesthesia Post Evaluation    Patient location during evaluation: PACU  Patient participation: complete - patient cannot participate  Level of consciousness: sleepy but conscious  Pain management: satisfactory to patient  Airway patency: patent  Cardiovascular status: acceptable  Respiratory status: acceptable, face mask and spontaneous ventilation  Hydration status: acceptable  Postoperative Nausea and Vomiting: none        No notable events documented.    "

## 2024-11-12 ENCOUNTER — PHARMACY VISIT (OUTPATIENT)
Dept: PHARMACY | Facility: CLINIC | Age: 64
End: 2024-11-12
Payer: COMMERCIAL

## 2024-11-12 VITALS
OXYGEN SATURATION: 96 % | DIASTOLIC BLOOD PRESSURE: 64 MMHG | HEIGHT: 69 IN | BODY MASS INDEX: 25.92 KG/M2 | HEART RATE: 61 BPM | WEIGHT: 175 LBS | RESPIRATION RATE: 16 BRPM | TEMPERATURE: 98.1 F | SYSTOLIC BLOOD PRESSURE: 134 MMHG

## 2024-11-12 LAB
ANION GAP SERPL CALC-SCNC: 9 MMOL/L (ref 10–20)
BUN SERPL-MCNC: 16 MG/DL (ref 6–23)
CALCIUM SERPL-MCNC: 8.3 MG/DL (ref 8.6–10.3)
CHLORIDE SERPL-SCNC: 103 MMOL/L (ref 98–107)
CO2 SERPL-SCNC: 31 MMOL/L (ref 21–32)
CREAT SERPL-MCNC: 1.54 MG/DL (ref 0.5–1.3)
EGFRCR SERPLBLD CKD-EPI 2021: 49 ML/MIN/1.73M*2
ERYTHROCYTE [DISTWIDTH] IN BLOOD BY AUTOMATED COUNT: 14.6 % (ref 11.5–14.5)
GLUCOSE SERPL-MCNC: 139 MG/DL (ref 74–99)
HCT VFR BLD AUTO: 43.1 % (ref 41–52)
HGB BLD-MCNC: 13.5 G/DL (ref 13.5–17.5)
MCH RBC QN AUTO: 27.6 PG (ref 26–34)
MCHC RBC AUTO-ENTMCNC: 31.3 G/DL (ref 32–36)
MCV RBC AUTO: 88 FL (ref 80–100)
NRBC BLD-RTO: 0 /100 WBCS (ref 0–0)
PLATELET # BLD AUTO: 173 X10*3/UL (ref 150–450)
POTASSIUM SERPL-SCNC: 3.6 MMOL/L (ref 3.5–5.3)
RBC # BLD AUTO: 4.9 X10*6/UL (ref 4.5–5.9)
SODIUM SERPL-SCNC: 139 MMOL/L (ref 136–145)
WBC # BLD AUTO: 11.6 X10*3/UL (ref 4.4–11.3)

## 2024-11-12 PROCEDURE — 99238 HOSP IP/OBS DSCHRG MGMT 30/<: CPT | Performed by: PHYSICIAN ASSISTANT

## 2024-11-12 PROCEDURE — 36415 COLL VENOUS BLD VENIPUNCTURE: CPT | Performed by: PHYSICIAN ASSISTANT

## 2024-11-12 PROCEDURE — 97161 PT EVAL LOW COMPLEX 20 MIN: CPT | Mod: GP

## 2024-11-12 PROCEDURE — 97116 GAIT TRAINING THERAPY: CPT | Mod: GP

## 2024-11-12 PROCEDURE — 80048 BASIC METABOLIC PNL TOTAL CA: CPT | Performed by: PHYSICIAN ASSISTANT

## 2024-11-12 PROCEDURE — RXMED WILLOW AMBULATORY MEDICATION CHARGE

## 2024-11-12 PROCEDURE — 97165 OT EVAL LOW COMPLEX 30 MIN: CPT | Mod: GO | Performed by: OCCUPATIONAL THERAPIST

## 2024-11-12 PROCEDURE — 2500000001 HC RX 250 WO HCPCS SELF ADMINISTERED DRUGS (ALT 637 FOR MEDICARE OP): Performed by: PHYSICIAN ASSISTANT

## 2024-11-12 PROCEDURE — 2500000004 HC RX 250 GENERAL PHARMACY W/ HCPCS (ALT 636 FOR OP/ED): Performed by: PHYSICIAN ASSISTANT

## 2024-11-12 PROCEDURE — 85027 COMPLETE CBC AUTOMATED: CPT | Performed by: PHYSICIAN ASSISTANT

## 2024-11-12 RX ORDER — OXYCODONE HYDROCHLORIDE 5 MG/1
5 TABLET ORAL EVERY 6 HOURS PRN
Qty: 28 TABLET | Refills: 0 | Status: SHIPPED | OUTPATIENT
Start: 2024-11-12 | End: 2024-11-19

## 2024-11-12 RX ORDER — DOCUSATE SODIUM 100 MG/1
100 CAPSULE, LIQUID FILLED ORAL 2 TIMES DAILY
Qty: 20 CAPSULE | Refills: 0 | Status: SHIPPED | OUTPATIENT
Start: 2024-11-12 | End: 2024-11-22

## 2024-11-12 RX ORDER — ASPIRIN 81 MG/1
81 TABLET ORAL DAILY
Start: 2024-11-12

## 2024-11-12 RX ORDER — CYCLOBENZAPRINE HCL 10 MG
10 TABLET ORAL 3 TIMES DAILY PRN
Qty: 12 TABLET | Refills: 0 | Status: SHIPPED | OUTPATIENT
Start: 2024-11-12 | End: 2024-11-16

## 2024-11-12 ASSESSMENT — COGNITIVE AND FUNCTIONAL STATUS - GENERAL
DRESSING REGULAR LOWER BODY CLOTHING: A LITTLE
TURNING FROM BACK TO SIDE WHILE IN FLAT BAD: A LITTLE
STANDING UP FROM CHAIR USING ARMS: A LITTLE
DAILY ACTIVITIY SCORE: 20
MOVING TO AND FROM BED TO CHAIR: A LITTLE
PERSONAL GROOMING: A LITTLE
MOVING TO AND FROM BED TO CHAIR: A LITTLE
HELP NEEDED FOR BATHING: A LITTLE
TURNING FROM BACK TO SIDE WHILE IN FLAT BAD: A LITTLE
DAILY ACTIVITIY SCORE: 23
CLIMB 3 TO 5 STEPS WITH RAILING: A LITTLE
MOVING FROM LYING ON BACK TO SITTING ON SIDE OF FLAT BED WITH BEDRAILS: A LITTLE
TOILETING: A LITTLE
MOBILITY SCORE: 19
WALKING IN HOSPITAL ROOM: A LITTLE
MOBILITY SCORE: 18
WALKING IN HOSPITAL ROOM: A LITTLE
DRESSING REGULAR LOWER BODY CLOTHING: A LITTLE
CLIMB 3 TO 5 STEPS WITH RAILING: A LITTLE
STANDING UP FROM CHAIR USING ARMS: A LITTLE

## 2024-11-12 ASSESSMENT — PAIN SCALES - GENERAL
PAINLEVEL_OUTOF10: 6
PAINLEVEL_OUTOF10: 7
PAINLEVEL_OUTOF10: 5 - MODERATE PAIN
PAINLEVEL_OUTOF10: 5 - MODERATE PAIN
PAINLEVEL_OUTOF10: 7

## 2024-11-12 ASSESSMENT — PAIN - FUNCTIONAL ASSESSMENT
PAIN_FUNCTIONAL_ASSESSMENT: 0-10

## 2024-11-12 ASSESSMENT — ACTIVITIES OF DAILY LIVING (ADL): ADL_ASSISTANCE: INDEPENDENT

## 2024-11-12 ASSESSMENT — PAIN DESCRIPTION - LOCATION: LOCATION: BACK

## 2024-11-12 NOTE — NURSING NOTE
Discharge instructions given and reviewed with patient. Patient verbalizes understanding. IV removed. Patient discharging home via wife's private vehicle.

## 2024-11-12 NOTE — DISCHARGE SUMMARY
Discharge Diagnosis  Back pain with radiation        Discharge summary      This patient Dao Becker was admitted to the hospital on 11/11/2024  after undergoing Procedure(s) (LRB):  L4-5 LAMINECTOMY + INSTRUMENTATION + POSTERIOR INTERBODY FUSION + POSTERIOR LATERAL FUSION (N/A) without complications.      Ortho agrees with all medical diagnoses and treatments while patient in hospital.  No significant or unexpected findings or abnormal ortho imaging were noted during the hospital stay    Hospital course      Patient tolerated surgical procedure well and there was no complications. Patient progressed adequately through their recovery during hospital stay including PT and rehabilitation.    Patient was then D/C on  to home  in stable condition.  Patient was instructed on the use of pain medications, the signs and symptoms of infection, and was given our number to call should they have any questions or concerns following discharge.    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 L4-5 laminectomy. 2) L4-5 posterior lumbar interbody fusion 3) L4-5 posterior lateral fusion 4) L4-5 instrumentation 5) L4-5 interbody cage 6) Thermal ablation of the medial nerve branch supplying the facets at L4-5 on the left. . OARRS report was run and has demonstrated an appropriate time course.  The patient has been provided with counseling pertaining to safe use of opioid medication.    Pertinent Physical Exam At Time of Discharge  Alert, oriented x 3, cooperative with examination.     Examination of the back reveals lumbar  dressing with scant shadowing .  No jose a-incisional ecchymosis.  EHL, plantarflexion, dorsiflexion are 4+/5.   Sensory intact light touch in the deep/superficial/common peroneal, saphenous, tibial, sural nerve  distributions.  DP and popliteal pulses are 2+ with capillary refill less than 2 seconds.  Negative Homans' sign.      Home Medications     Medication List      START taking these medications     cyclobenzaprine 10 mg tablet; Commonly known as: Flexeril; Take 1 tablet   (10 mg) by mouth 3 times a day as needed for muscle spasms for up to 4   days.   docusate sodium 100 mg capsule; Commonly known as: Colace; Take 1   capsule (100 mg) by mouth 2 times a day for 10 days. Stop this medication   if you have diarrhea   oxyCODONE 5 mg immediate release tablet; Commonly known as: Roxicodone;   Take 1 tablet (5 mg) by mouth every 6 hours if needed for moderate pain (4   - 6) or severe pain (7 - 10) for up to 7 days.     CHANGE how you take these medications     aspirin 81 mg EC tablet; Take 1 tablet (81 mg) by mouth once daily.   Restart on 11/16; What changed: additional instructions     CONTINUE taking these medications     ALPRAZolam 1 mg tablet; Commonly known as: Xanax   doxycycline 100 mg capsule; Commonly known as: Monodox   tadalafil 5 mg tablet; Commonly known as: Cialis   tiZANidine 4 mg capsule; Commonly known as: Zanaflex     STOP taking these medications     chlorhexidine 0.12 % solution; Commonly known as: Peridex   ezetimibe 10 mg tablet; Commonly known as: Zetia   metoprolol succinate XL 25 mg 24 hr tablet; Commonly known as: Toprol-XL   Repatha SureClick 140 mg/mL injection; Generic drug: evolocumab   traMADol 50 mg tablet; Commonly known as: Ultram             Surgical dressing to be removed on 11/13 and incision left open to air; then you may shower  Frequent ambulation for DVT prophylaxis   Follow up with surgeon in 2 weeks    Radiology images FL fluoro images no charge    Result Date: 11/11/2024  These images are not reportable by radiology and will not be interpreted by  Radiologists.    ECG 12 Lead    Result Date: 11/1/2024  Normal sinus rhythm Possible Left atrial enlargement Left ventricular  hypertrophy T wave abnormality, consider inferior ischemia T wave abnormality, consider anterolateral ischemia Abnormal ECG No previous ECGs available Confirmed by Sawyer Avila ([id number]) on 11/1/2024 4:14:23 PM       Outpatient Follow-Up  Future Appointments   Date Time Provider Department Center   11/26/2024 11:00 AM Dontrell Nelson MD [id number] [place]   1/27/2025  2:15 PM Isidoro Crews MD [id number] [place]         Pamela Banda PA-C

## 2024-11-12 NOTE — PROGRESS NOTES
"Dao Becker \"Zaria" is a 64 y.o. male on day 1 of admission presenting with Back pain with radiation.    Subjective   Mr. Becker tells me he no longer has his bilateral leg pain that was present prior to surgery.  His Carlin was removed early this morning, since then he has voided.  He has been up in the [place] working with therapy and has worked on stairs.       Objective     Physical Exam  Vitals reviewed.   HENT:      Head: Normocephalic.      Mouth/Throat:      Mouth: Mucous membranes are moist.      Pharynx: Oropharynx is clear.   Eyes:      Extraocular Movements: Extraocular movements intact.   Cardiovascular:      Rate and Rhythm: Normal rate.   Pulmonary:      Effort: Pulmonary effort is normal.   Abdominal:      Palpations: Abdomen is soft.   Musculoskeletal:      Comments: Lumbar dressings with scant shadowing,   light touch sensation is intact, ankle dorsiflexion/plantar flexion is intact. DP 2+/2 palpable     Skin:     General: Skin is warm and dry.      Capillary Refill: Capillary refill takes less than 2 seconds.   Neurological:      General: No focal deficit present.      Mental Status: He is alert. Mental status is at baseline.   Psychiatric:         Mood and Affect: Mood normal.         Last Recorded Vitals  Blood pressure 133/60, pulse 68, temperature 36.6 °C (97.9 °F), temperature source Temporal, resp. rate 16, height 1.753 m (5' 9\"), weight 79.4 kg (175 lb), SpO2 92%.  Intake/Output last 3 Shifts:  I/O last 3 completed shifts:  In: 2100 (26.5 mL/kg) [I.V.:2000 (25.2 mL/kg); IV Piggyback:100]  Out: 3200 (40.3 mL/kg) [Urine:3200 (1.1 mL/kg/hr)]  Weight: 79.4 kg     Relevant Results      Scheduled medications  acetaminophen, 650 mg, oral, q6h  oxygen, , inhalation, Continuous - 02/gases  polyethylene glycol, 17 g, oral, Daily      Continuous medications  lactated Ringer's, 100 mL/hr, Last Rate: Stopped (11/11/24 0489)  sodium chloride 0.9%, 100 mL/hr, Last Rate: Stopped (11/12/24 5564)      PRN " medications  PRN medications: ALPRAZolam, cyclobenzaprine, dextrose, dextrose, glucagon, glucagon, morphine, naloxone, ondansetron **OR** ondansetron, oxyCODONE, oxyCODONE, oxyCODONE  Results for orders placed or performed during the hospital encounter of 11/11/24 (from the past 24 hours)   CBC   Result Value Ref Range    WBC 11.6 (H) 4.4 - 11.3 x10*3/uL    nRBC 0.0 0.0 - 0.0 /100 WBCs    RBC 4.90 4.50 - 5.90 x10*6/uL    Hemoglobin 13.5 13.5 - 17.5 g/dL    Hematocrit 43.1 41.0 - 52.0 %    MCV 88 80 - 100 fL    MCH 27.6 26.0 - 34.0 pg    MCHC 31.3 (L) 32.0 - 36.0 g/dL    RDW 14.6 (H) 11.5 - 14.5 %    Platelets 173 150 - 450 x10*3/uL   Basic metabolic panel   Result Value Ref Range    Glucose 139 (H) 74 - 99 mg/dL    Sodium 139 136 - 145 mmol/L    Potassium 3.6 3.5 - 5.3 mmol/L    Chloride 103 98 - 107 mmol/L    Bicarbonate 31 21 - 32 mmol/L    Anion Gap 9 (L) 10 - 20 mmol/L    Urea Nitrogen 16 6 - 23 mg/dL    Creatinine 1.54 (H) 0.50 - 1.30 mg/dL    eGFR 49 (L) >60 mL/min/1.73m*2    Calcium 8.3 (L) 8.6 - 10.3 mg/dL               FL fluoro images no charge    Result Date: 11/11/2024  These images are not reportable by radiology and will not be interpreted by  Radiologists.    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 L4-5 laminectomy. 2) L4-5 posterior lumbar interbody fusion 3) L4-5 posterior lateral fusion 4) L4-5 instrumentation 5) L4-5 interbody cage 6) Thermal ablation of the medial  nerve branch supplying the facets at L4-5 on the left.   PT/OT, WBAT B LE, wear lumbar brace for ambulation of distances greater than 10 feet  Multimodal pain regimen  Bowel regimen  Surgical dressing to be removed  on 11/13, and then may shower  Labwork reviewed  VTE prophylaxis: BRITTNY stockings, SCDs, early ambulation  Disp: home   Follow up with Dr. Nelson as directed         I spent 25 minutes in the professional and overall care of this patient.      Pamela Banda PA-C

## 2024-11-12 NOTE — PROGRESS NOTES
"Physical Therapy    Physical Therapy Evaluation and Treatment    Patient Name: Dao Becker \"Benigno\"  MRN: [id number]  Today's Date: 11/12/2024   Time Calculation  Start Time: 0944  Stop Time: 1012  Time Calculation (min): 28 min  4101/4101-A    Assessment/Plan   PT Assessment  PT Assessment Results: Decreased strength, Decreased endurance, Impaired balance, Decreased mobility, Pain, Orthopedic restrictions  Rehab Prognosis: Good  Evaluation/Treatment Tolerance: Patient tolerated treatment well  Medical Staff Made Aware: Yes  End of Session Communication: Bedside nurse  Assessment Comment: Pt presents today below baseline level of function and requires continued PT during hospital stay. Pt requires PT at a low intensity level at discharge to maximize functional mobility and safety. Recommend pt use a FWW at all times when ambulating in order to maximize safety and stability.     End of Session Patient Position: Up in chair  IP OR SWING BED PT PLAN  Inpatient or Swing Bed: Inpatient  PT Plan  Treatment/Interventions: Bed mobility, Transfer training, Gait training, Balance training, Neuromuscular re-education, Strengthening, Endurance training, Range of motion, Therapeutic exercise, Therapeutic activity, Home exercise program, Stair training  PT Plan: Ongoing PT  PT Frequency: Daily  PT Discharge Recommendations: Low intensity level of continued care  Equipment Recommended upon Discharge: Wheeled walker  PT Recommended Transfer Status: Assist x1  PT - OK to Discharge: Yes (To next level of care when cleared by medical team   )    Subjective       General Visit Information:  General  Reason for Referral: recent spine surgery  Referred By: Dontrell Nelson MD  Past Medical History Relevant to Rehab: Admitted 11/11/24 following completion of 1) 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  L4-5 laminectomy.  2) L4-5 posterior lumbar interbody fusion  3) L4-5 posterior lateral fusion 4) L4-5 " instrumentation 5) L4-5 interbody cage  6) Thermal ablation of the medial nerve branch supplying the facets at L4-5 on the left. PMH: cervical spinal stenosis, DVT, CKD, CAD  Family/Caregiver Present: No  Prior to Session Communication: Bedside nurse  Patient Position Received: Up in chair  Preferred Learning Style: verbal  General Comment: Pt agreeable to PT, nursing cleared for treatment.    Home Living:  Home Living  Type of Home: House  Lives With: Spouse  Home Layout: One level  Home Access: Stairs to enter with rails  Entrance Stairs-Number of Steps: 2  Bathroom Shower/Tub: Walk-in shower    Prior Level of Function:  Prior Function Per Pt/Caregiver Report  ADL Assistance: Independent  Homemaking Assistance: Independent (shares with spouse, spouse manages laundry)  Ambulatory Assistance: Independent  Prior Function Comments: denies any falls in the past 6 months    Precautions:  Precautions  Medical Precautions: Fall precautions  Post-Surgical Precautions: Spinal precautions  Braces Applied: LSO brace donned throughout session  Precautions Comment: LSO brace when up       Objective     Pain:  Pain Assessment  Pain Assessment: 0-10  0-10 (Numeric) Pain Score: 5 - Moderate pain  Pain Type: Surgical pain  Pain Location: Back  Pain Interventions: Ambulation/increased activity, Repositioned    Cognition:  Cognition  Overall Cognitive Status: Within Functional Limits  Orientation Level: Oriented X4    General Assessments:      Activity Tolerance  Endurance: Endurance does not limit participation in activity  Sensation  Sensation Comment: denies numbness and tingling              Static Sitting Balance  Static Sitting-Comment/Number of Minutes: good  Dynamic Sitting Balance  Dynamic Sitting-Comments: good  Static Standing Balance  Static Standing-Comment/Number of Minutes: fair  Dynamic Standing Balance  Dynamic Standing-Comments: fair    Functional Assessments:     Bed Mobility  Bed Mobility: No  Transfers  Transfer:  Yes  Transfer 1  Transfer From 1: Sit to  Transfer to 1: Stand  Transfer Device 1: Walker  Transfer Level of Assistance 1: Contact guard  Transfers 2  Transfer From 2: Stand to  Transfer to 2: Sit  Transfer Device 2: Walker  Transfer Level of Assistance 2: Contact guard  Ambulation/Gait Training  Ambulation/Gait Training Performed: Yes  Ambulation/Gait Training 1  Device 1: Rolling walker  Gait Support Devices: Gait belt  Assistance 1: Contact guard  Quality of Gait 1: Narrow base of support, Decreased step length  Comments/Distance (ft) 1: 150 feet, pt tremulous throughout  Stairs  Stairs: Yes  Stairs  Rails 1: Right  Device 1: Railing  Support Devices 1: Gait belt  Assistance 1: Contact guard, Minimum assistance  Comment/Number of Steps 1: 3 stairs x 2 using step to step pattern. First trial CGA with use of cane and rail, second trial min A using rail and hand held assist.     Treatment    Pt educated on spinal precautions and log roll technique. Cues for safe hand placement with use of FWW for sit<>stand. Instruction provided in proper technique for ambulation with FWW. Pt educated on proper sequencing and safety for stair climbing. Educated pt on proper use of LSO brace.     Extremity/Trunk Assessments:        RLE   RLE : Within Functional Limits (wiht functional movement assessment)  LLE   LLE : Within Functional Limits (with functional movement assessment)    Outcome Measures:     [facility] Basic Mobility  Turning from your back to your side while in a flat bed without using bedrails: A little  Moving from lying on your back to sitting on the side of a flat bed without using bedrails: A little  Moving to and from bed to chair (including a wheelchair): A little  Standing up from a chair using your arms (e.g. wheelchair or bedside chair): A little  To walk in hospital room: A little  Climbing 3-5 steps with railing: A little  Basic Mobility - Total Score: 18                      Goals:  Encounter Problems        Encounter Problems (Active)       PT Problem       Pt will perform bed mobility with mod I.        Start:  11/12/24    Expected End:  11/26/24            Pt will complete sit <> stand and bed <> chair transfers with mod I.        Start:  11/12/24    Expected End:  11/26/24            Pt will ambulate 300 feet mod I using FWW with no significant gait deviations.         Start:  11/12/24    Expected End:  11/26/24            Pt will ascend/descend at least 2 stairs using rail and cane SBA in order to navigate home environment.         Start:  11/12/24    Expected End:  11/26/24                 Education Documentation  Precautions, taught by Sheba Diamond PT at 11/12/2024  1:32 PM.  Learner: Patient  Readiness: Acceptance  Method: Explanation, Demonstration  Response: Verbalizes Understanding, Demonstrated Understanding    Body Mechanics, taught by Sheba Diamond PT at 11/12/2024  1:32 PM.  Learner: Patient  Readiness: Acceptance  Method: Explanation, Demonstration  Response: Verbalizes Understanding, Demonstrated Understanding    Mobility Training, taught by Sheba Diamond PT at 11/12/2024  1:32 PM.  Learner: Patient  Readiness: Acceptance  Method: Explanation, Demonstration  Response: Verbalizes Understanding, Demonstrated Understanding    Education Comments  No comments found.

## 2024-11-12 NOTE — PROGRESS NOTES
"Occupational Therapy    Evaluation    Patient Name: Dao Becker \"Benigno\"  MRN: [id number]  Today's Date: 11/12/2024  Time Calculation  Start Time: 1021  Stop Time: 1037  Time Calculation (min): 16 min  4101/4101-A  Eval only     Assessment  IP OT Assessment  Prognosis: Good  End of Session Patient Position: Up in chair  Patient presents with decline in ADLs, functional transfers, functional mobility and would benefit from OT during acute stay to improve functional independence and safety. Recommend low intensity OT to maximize functional independence and safety.     Plan:  Treatment Interventions: ADL retraining, Functional transfer training, Patient/family training, Equipment evaluation/education, Compensatory technique education  OT Frequency: Daily  OT Discharge Recommendations: Low intensity level of continued care  Equipment Recommended upon Discharge: Wheeled walker (shower chair, reacher, sock aide, LH shoe horn)  OT - OK to Discharge: Yes from acute care OT services to the next level of care when cleared by medical team      Subjective   Current Problem:  1. Back pain with radiation        2. Spinal stenosis, lumbar region without neurogenic claudication  Surgical Pathology Exam    Surgical Pathology Exam    docusate sodium (Colace) 100 mg capsule      3. Spondylolisthesis, lumbar region  Surgical Pathology Exam    Surgical Pathology Exam      4. Cardiac murmur  aspirin 81 mg EC tablet      5. Snoring  aspirin 81 mg EC tablet      6. Obstructive sleep apnea syndrome, severe  aspirin 81 mg EC tablet      7. Dizziness  aspirin 81 mg EC tablet      8. Lightheaded  aspirin 81 mg EC tablet      9. Bilateral carotid bruits  aspirin 81 mg EC tablet      10. Palpitations  aspirin 81 mg EC tablet      11. Former smoker, stopped smoking in distant past  aspirin 81 mg EC tablet      12. Dyspnea on exertion  aspirin 81 mg EC tablet      13. Abnormal EKG  aspirin 81 mg EC tablet      14. Chest pain, unspecified type  " aspirin 81 mg EC tablet      15. Cervical spinal stenosis  aspirin 81 mg EC tablet      16. Low back pain, unspecified back pain laterality, unspecified chronicity, unspecified whether sciatica present  aspirin 81 mg EC tablet    cyclobenzaprine (Flexeril) 10 mg tablet    oxyCODONE (Roxicodone) 5 mg immediate release tablet      17. Elevated troponin  aspirin 81 mg EC tablet      18. MONTRELL (generalized anxiety disorder)  aspirin 81 mg EC tablet      19. Family history of ischemic heart disease  aspirin 81 mg EC tablet      20. Familial hypercholesterolemia  aspirin 81 mg EC tablet      21. Past use of tobacco  aspirin 81 mg EC tablet      22. LVH (left ventricular hypertrophy)  aspirin 81 mg EC tablet      23. Syncope and collapse  aspirin 81 mg EC tablet      24. Preoperative cardiovascular examination  aspirin 81 mg EC tablet      25. Nonrheumatic aortic valve stenosis  aspirin 81 mg EC tablet      26. Coronary artery disease involving native coronary artery of native heart, unspecified whether angina present  aspirin 81 mg EC tablet      27. Elevated coronary artery calcium score  aspirin 81 mg EC tablet          General:  General  Reason for Referral: recent spine surgery  Referred By: Dontrell Nelson MD  Past Medical History Relevant to Rehab: Admitted 11/11/2024 after having the following completed by Dr Nelson:     L4-5 LAMINECTOMY + INSTRUMENTATION + POSTERIOR INTERBODY FUSION + POSTERIOR LATERAL FUSION      PMH: ADD,DVT, HLD, CKD, CAD  Prior to Session Communication: Bedside nurse  Patient Position Received: Up in chair, Alarm off, not on at start of session  Preferred Learning Style: verbal  General Comment: Patient in bedside chair and agreeable to participate in OT evaluation.  Patient is tremulous throughout session.     Precautions:  Medical Precautions: Fall precautions  Post-Surgical Precautions: Spinal precautions  Braces Applied: back brace    Pain:  Pain Assessment  Pain Assessment: 0-10  0-10  "(Numeric) Pain Score: 6  Pain Type: Surgical pain  Pain Location: Back  Pain Interventions: Rest    Objective   Cognition:  Overall Cognitive Status: Within Functional Limits    Home Living:  Home Living Comments: Lives in 1 story home with spouse with 2 KATLIN with railing.  Has WIS with no DME.     Prior Function:  Prior Function Comments: Was independent with all ADLs.  Spouse manages laundry. Was independent with cooking and cleaning.  Denies any falls in the past 6 months.    ADL:  LE Dressing Assistance: Minimal (don underwear and pants)  ADL Comments: Educated patient on safety and comp strategies for LB dressing. Reports owning \"hip kit\".    Activity Tolerance:  Endurance: Endurance does not limit participation in activity    Bed Mobility/Transfers:   Transfers  Transfer:  (CGA sit <> stand with min verbal cues for proper hand placement and technique)  Educated patient on safety with car transfers and patient verbalized understanding.    Educated patient on safety and use of shower chair for safety and patient verbalized understanding. Educated patient on having [facility] assess shower safety prior to completing first shower and patient verbalized understanding.     Extremities: RUE   RUE : Within Functional Limits and LUE   LUE: Within Functional Limits    Outcome Measures: [facility] Daily Activity  Putting on and taking off regular lower body clothing: A little  Bathing (including washing, rinsing, drying): A little  Putting on and taking off regular upper body clothing: None  Toileting, which includes using toilet, bedpan or urinal: A little  Taking care of personal grooming such as brushing teeth: A little  Eating Meals: None  Daily Activity - Total Score: 20    EDUCATION:  Education  Individual(s) Educated: Patient  Education Provided: Fall precautons (spinal precautions, safety and comp strategies with LB dressing)  Patient Response to Education: Patient/Caregiver Verbalized Understanding of Information      Goals: "   Encounter Problems       Encounter Problems (Active)       Dressings Lower Extremities       STG - Patient to complete lower body dressing independently with AE and comp strategies  (Progressing)       Start:  11/12/24    Expected End:  11/26/24               Functional Balance       STG-Patient will be independent with assistive device dynamic stand task >5 minutes for ADL completion   (Progressing)       Start:  11/12/24    Expected End:  11/26/24               Functional Mobility       STG-Patient will be independent with assistive device functional mobility tasks   (Progressing)       Start:  11/12/24    Expected End:  11/26/24               OT Transfers       STG - Patient will perform car transfers independently demonstrating good safety  (Progressing)       Start:  11/12/24    Expected End:  11/26/24            STG-Patient will be independent with functional transfers demonstrating good safety   (Progressing)       Start:  11/12/24    Expected End:  11/26/24               Safety       STG-Patient will independently verbalize spine precautions with all functional tasks   (Progressing)       Start:  11/12/24    Expected End:  11/26/24

## 2024-11-26 ENCOUNTER — HOSPITAL ENCOUNTER (OUTPATIENT)
Dept: RADIOLOGY | Facility: CLINIC | Age: 64
Discharge: HOME | End: 2024-11-26
Payer: MEDICARE

## 2024-11-26 ENCOUNTER — OFFICE VISIT (OUTPATIENT)
Dept: ORTHOPEDIC SURGERY | Facility: CLINIC | Age: 64
End: 2024-11-26
Payer: MEDICARE

## 2024-11-26 DIAGNOSIS — M54.10 BACK PAIN WITH RADICULOPATHY: ICD-10-CM

## 2024-11-26 LAB
LAB AP ASR DISCLAIMER: NORMAL
LABORATORY COMMENT REPORT: NORMAL
PATH REPORT.FINAL DX SPEC: NORMAL
PATH REPORT.GROSS SPEC: NORMAL
PATH REPORT.RELEVANT HX SPEC: NORMAL
PATH REPORT.TOTAL CANCER: NORMAL

## 2024-11-26 PROCEDURE — 72100 X-RAY EXAM L-S SPINE 2/3 VWS: CPT

## 2024-11-26 PROCEDURE — 99211 OFF/OP EST MAY X REQ PHY/QHP: CPT | Performed by: ORTHOPAEDIC SURGERY

## 2024-11-26 PROCEDURE — 72100 X-RAY EXAM L-S SPINE 2/3 VWS: CPT | Performed by: ORTHOPAEDIC SURGERY

## 2024-11-26 PROCEDURE — 1111F DSCHRG MED/CURRENT MED MERGE: CPT | Performed by: ORTHOPAEDIC SURGERY

## 2024-11-26 PROCEDURE — 99024 POSTOP FOLLOW-UP VISIT: CPT | Performed by: ORTHOPAEDIC SURGERY

## 2024-11-26 NOTE — PROGRESS NOTES
Chief complaint: 2 weeks out L4-5 MIS TLIF.    HPI: Patient is 75% improved with his legs.  The back is no better compared before surgery.  He has been up walking around is much as possible.  Is been using his brace.  He has no bowel or bladder changes.  He is happy with how he is doing.  He is heading in the right direction.    Physical exam: Incisions are perfectly well-healed.  There is small seromas under each incision but there is no redness warmth swelling or signs of infection.  There is no drainage.  He does have some distal ecchymosis suggestive that he did have some bleeding that traveled down distally from him sitting up.    AP lateral x-rays were ordered and reviewed today and shows good positioning of cage screws and rods.    Assessment/plan: Patient doing well 2 weeks out L4-5 MIS TLIF.  We will let him continue with BLT and 10 pound lifting restrictions.  He will keep using his brace when he is up walking.  I encouraged him to walk is much as possible.  He will follow-up with us in 3 months for AP lateral x-rays of the lumbar spine.  He will see us sooner if he has any questions or concerns.

## 2025-01-27 ENCOUNTER — APPOINTMENT (OUTPATIENT)
Dept: CARDIOLOGY | Facility: CLINIC | Age: 65
End: 2025-01-27
Payer: MEDICARE

## 2025-02-28 ENCOUNTER — HOSPITAL ENCOUNTER (OUTPATIENT)
Dept: RADIOLOGY | Facility: CLINIC | Age: 65
Discharge: HOME | End: 2025-02-28
Payer: MEDICARE

## 2025-02-28 ENCOUNTER — OFFICE VISIT (OUTPATIENT)
Dept: ORTHOPEDIC SURGERY | Facility: CLINIC | Age: 65
End: 2025-02-28
Payer: MEDICARE

## 2025-02-28 DIAGNOSIS — M54.50 LUMBAR PAIN: ICD-10-CM

## 2025-02-28 DIAGNOSIS — M54.16 LUMBAR RADICULOPATHY: Primary | ICD-10-CM

## 2025-02-28 PROCEDURE — 72100 X-RAY EXAM L-S SPINE 2/3 VWS: CPT

## 2025-02-28 PROCEDURE — 99214 OFFICE O/P EST MOD 30 MIN: CPT | Performed by: ORTHOPAEDIC SURGERY

## 2025-02-28 NOTE — PROGRESS NOTES
"Dao Becker \"Zaria" is a 64 y.o. male who presents for Follow-up of the Lower Back (L4-5 MIS TLIF 11/11/24 /3 1/2 months out/Xray today).    HPI:  64-year-old gentleman here for surgical follow-up.  He is 3-1/2 months out from an L4-5 MIS TLIF.  He denies any fever chills nausea vomiting night sweats.  He has no bowel or bladder complaints.    Physical exam:  Well-nourished, well kept.No lymphangitis or lymphadenopathy in the examined extremities.  Gait normal.  Can stand on heels and toes if he balances on the counter.   Examination of the back shows tenderness in the paraspinous musculature.  There is decreased range of motion in all directions due to guarding/muscle spasms and pain at extremes.  There is good strength and no instability.  Examination of the lower extremities reveals no point tenderness, swelling, or deformity.  Range of motion of the hips, knees, and ankles are full without crepitance, instability, or exacerbation of pain.  Strength is 5/5 throughout.  No redness, abrasions, or lesions on extremities  Gross sensation intact in the extremities.  Affect normal.  Alert and oriented ×3.  Coordination normal.  MIS incisions are very well-healed.  No signs of redness tenderness swelling warmth or dehiscence or drainage.    Imaging studies:  AP lateral plain films of lumbar spine were ordered and reviewed today.    Assessment:  64-year-old gentleman here for surgical follow-up.  He is 3-1/2 months out from an L4-5 MIS TLIF.  Overall he is doing well, he says his legs are still 75% better than before surgery, he still getting a little cramping in the legs.  He is trying to walk is much as possible.  His back pain has not improved much since his last visit.  His incisions are very well-healed.    We have ordered and reviewed test today, x-rays.  We reviewed a basic metabolic panel from February 19, 2025 glucose is 98.  This is a patient with 2 chronic stable problems, back pain, leg symptoms.    For " complete plan and/or surgical details, please refer to Dr. Nelson's portion of this split dictation.    -Da Baez PA-C    In a face-to-face encounter, I performed a history and physical examination, discussed pertinent diagnostic studies if indicated, and discussed diagnosis and management strategies with both the patient and the midlevel provider.  I reviewed the midlevel's note and agree with the documented findings and plan of care.    3-1/2 months out L4-5 MIS TLIF.  He says his left leg is no better compared to before surgery.  It was better than about 2 and half weeks ago he started getting pain and cramping down into the calf.  The right legs about the same as before surgery as well.  He has not the back is a whole lot better compared to before surgery so overall he says he was feeling better but about 2-1/2 3 weeks ago he noticed a little bit of a turn for the worse.  He thinks is because he has been an active.  He wants to try to do more things.  He has no fevers chills nausea vomiting.  No bowel or bladder changes.  X-rays were ordered and reviewed today and it does show good positioning of hardware no evidence of any failure.  We reviewed labs from February 19 and it shows his glucose is 98.    Assessment/plan: Patient not doing a lot better now compared to before surgery.  He was doing better but he took a little bit of a turn for the worse a few weeks ago and he is not sure why.  I recommended maybe trying some gabapentin but he says he has a lot of issues with that as it makes his brain real foggy so we will hold off on that.  Will get a get him into some physical therapy and see him back in 6 weeks and see how he is doing.  To come back and see us sooner if he has any questions or concerns or he worsens.  If he is not doing much better in 6 weeks we will get an MRI of his lumbar spine.    Dontrell Nelson MD  Orthopedic surgery

## 2025-03-26 ENCOUNTER — APPOINTMENT (OUTPATIENT)
Dept: ORTHOPEDIC SURGERY | Facility: CLINIC | Age: 65
End: 2025-03-26
Payer: MEDICARE

## 2025-03-26 DIAGNOSIS — M54.50 LUMBAR PAIN: ICD-10-CM

## 2025-03-26 DIAGNOSIS — M54.16 LUMBAR RADICULOPATHY: ICD-10-CM

## 2025-03-26 PROCEDURE — 99213 OFFICE O/P EST LOW 20 MIN: CPT | Performed by: PHYSICIAN ASSISTANT

## 2025-03-26 NOTE — PROGRESS NOTES
Follow-up after physical therapy.  Patient did physical therapy neck she states it got worse.  He is get back pain on the left side.  And now he has pain into the right leg right thigh.  Down to the knee.  Nothing really past.  No bowel or bladder complaints of fever chills nausea vomiting or night sweats no saddle anesthesia.  He had an MIS TLIF L4-5    Physical exam: Well-nourished, well kept.  t no lymphangitis or lymphadenopathy in the examined extremities.  Good perfusion to the lower extremities bilaterally.  Dorsalis pedis pulses 2+.  Capillary refill to the digits brisk.  No distal edema.  Patient ambulating with mild antalgic gait.  Decreased range of motion flexion-extension rotation lumbar spine tender good strength no instability.  Examination of the lower extremities reveals no point tenderness, swelling, or deformity.  Range of motion of the hips, knees, and ankles are full without crepitance, instability, or exacerbation of pain.  Strength is 5/5 throughout.  No redness, abrasions, or lesions on the lower extremities bilaterally.  Patient neurologically intact    Assessment: This a patient over 3 months from a MIS TLIF L4-5.  He is got nerve type symptoms.  The got worse after physical therapy that needs further evaluation.  Affecting his bodily function.    Plan: Will get an MRI with and without contrast.  He will follow-up with Dr. Nelson with that MRI.

## 2025-04-11 ENCOUNTER — APPOINTMENT (OUTPATIENT)
Dept: ORTHOPEDIC SURGERY | Facility: CLINIC | Age: 65
End: 2025-04-11
Payer: MEDICARE

## 2025-04-15 ENCOUNTER — HOSPITAL ENCOUNTER (OUTPATIENT)
Dept: RADIOLOGY | Facility: HOSPITAL | Age: 65
Discharge: HOME | End: 2025-04-15
Payer: MEDICARE

## 2025-04-15 DIAGNOSIS — M54.50 LUMBAR PAIN: ICD-10-CM

## 2025-04-15 DIAGNOSIS — M54.16 LUMBAR RADICULOPATHY: ICD-10-CM

## 2025-04-15 PROCEDURE — A9575 INJ GADOTERATE MEGLUMI 0.1ML: HCPCS | Performed by: PHYSICIAN ASSISTANT

## 2025-04-15 PROCEDURE — 2550000001 HC RX 255 CONTRASTS: Performed by: PHYSICIAN ASSISTANT

## 2025-04-15 PROCEDURE — 72158 MRI LUMBAR SPINE W/O & W/DYE: CPT

## 2025-04-15 RX ORDER — GADOTERATE MEGLUMINE 376.9 MG/ML
0.2 INJECTION INTRAVENOUS
Status: COMPLETED | OUTPATIENT
Start: 2025-04-15 | End: 2025-04-15

## 2025-04-15 RX ADMIN — GADOTERATE MEGLUMINE 15 ML: 376.9 INJECTION INTRAVENOUS at 14:46

## 2025-04-22 ENCOUNTER — OFFICE VISIT (OUTPATIENT)
Dept: ORTHOPEDIC SURGERY | Facility: CLINIC | Age: 65
End: 2025-04-22
Payer: MEDICARE

## 2025-04-22 DIAGNOSIS — M54.50 LUMBAR PAIN: ICD-10-CM

## 2025-04-22 DIAGNOSIS — M54.16 LUMBAR RADICULOPATHY: ICD-10-CM

## 2025-04-22 PROCEDURE — 1159F MED LIST DOCD IN RCRD: CPT | Performed by: ORTHOPAEDIC SURGERY

## 2025-04-22 PROCEDURE — 99212 OFFICE O/P EST SF 10 MIN: CPT | Performed by: ORTHOPAEDIC SURGERY

## 2025-04-22 PROCEDURE — 99213 OFFICE O/P EST LOW 20 MIN: CPT | Performed by: ORTHOPAEDIC SURGERY

## 2025-04-22 PROCEDURE — 1036F TOBACCO NON-USER: CPT | Performed by: ORTHOPAEDIC SURGERY

## 2025-04-22 NOTE — PROGRESS NOTES
Chief complaint: 6 months out L4-5 MIS TLIF with some continued bilateral leg pain    HPI: I saw the patient back February 28 and he was having some increase in leg symptoms.  He was doing quite well after surgery and then took a turn for the worse.  We therefore ordered an MRI.    On exam his back incisions are well-healed.  He moves freely.  He walks normally.  He has good strength in his legs.    MRI of the lumbar spine was reviewed from April 15, 2025 and it was compared to July 9, 2024.  He has significant improvement in his central stenosis and foraminal stenosis at L4-5.  I do not see any stenosis anywhere in the lumbar spine.    Assessment/plan: Patient with 2 chronic stable problems of some back pain as well as bilateral leg pain in his thighs and hamstrings to his knees right greater than the left.  He says it wakes him up at night.  MRI of his lumbar spine is completely normal and looks like he has had a nice decompression from an L4-5 MIS TLIF.  Glucose was reviewed from February 19, 2025 and is 98.  He says he does not have diabetes, drink or smoke.  We are going to send him onto neurology for a workup to see if this might be some sort of neurologic condition as I do not see any indication where the spine would be the cause of his symptoms.  Will also get him into pain management because at some point he may need a spinal cord stimulator.  I will see him back in 6 months for AP lateral x-rays of the lumbar spine.

## (undated) DEVICE — GLOVE, SURGICAL, PROTEXIS PI MICRO, 7.5, PF, LF

## (undated) DEVICE — Device

## (undated) DEVICE — GOWN, SURGICAL, IMPLT, BACK, XLARGE, XLONG, STERILE

## (undated) DEVICE — SUTURE, STRATAFIX, SPIRAL MONOCRYL PLUS, 3-0, PS-2 45CM, UNDYED

## (undated) DEVICE — NEEDLE, SPINAL, QUINCKE, 18 G X 3.5 IN, PINK HUB

## (undated) DEVICE — ALCOHOL, ISOPROPYL, 70%, 4OZ

## (undated) DEVICE — DRAPE, TOWEL, STERI DRAPE, 17 X 11 IN, PLASTIC, STERILE

## (undated) DEVICE — COVER, EQUIPMENT, DOME COVER, SNAP CAP, 30 IN, CLEAR, LF

## (undated) DEVICE — DRAPE, INCISE, ANTIMICROBIAL, IOBAN 2, STERI DRAPE, 23 X 33 IN, DISPOSABLE, STERILE

## (undated) DEVICE — DRAPE, MICROSCOPE, W/REMOVABLE LENS

## (undated) DEVICE — DRESSING, TRANSPARENT, TEGADERM, 4 X 4-3/4 IN

## (undated) DEVICE — GLOVE, SURGICAL, PROTEXIS PI BLUE W/NEUTHERA, 8.0, PF, LF

## (undated) DEVICE — DRAPE, SHEET, XL

## (undated) DEVICE — SUTURE, VICRYL, 2-0, 18 IN CP-2, UNDYED

## (undated) DEVICE — NEEDLE, PEDIGUARD, CANNULATED P2

## (undated) DEVICE — ADHESIVE, SKIN, MASTISOL, 2/3 CC VIAL

## (undated) DEVICE — BUR, 3MM PRECISION MATCH HEAD, 16CM

## (undated) DEVICE — CATHETER, IV, ANGIOCATH, SPECIAL PLACEMENT, 14 G X 3.25 IN, FEP POLYMER

## (undated) DEVICE — TIP,  ELECTRODE COATED INSULATED, EXTENDED, LF

## (undated) DEVICE — DRESSING, GAUZE, DRAIN SPONGE, 6 PLY, EXCILON, 4 X 4 IN, STERILE

## (undated) DEVICE — GLOVE, SURGICAL, PROTEXIS PI , 7.5, PF, LF

## (undated) DEVICE — TOWEL PACK, STERILE, 4/PACK, BLUE

## (undated) DEVICE — STRIP, SKIN CLOSURE, STERI STRIP, REINFORCED, 0.5 X 4 IN

## (undated) DEVICE — DRAPE, C-ARM IMAGE

## (undated) DEVICE — HANDLE, PEDIGUARD, CANNULATED

## (undated) DEVICE — TAPE, SURGICAL, FOAM, MICROFOAM, HYPOALLERGENIC, 3 IN X 5.5 YD

## (undated) DEVICE — SUTURE, VICRYL, 0, 27 IN, UR-6, VIOLET

## (undated) DEVICE — SEALANT, HEMOSTATIC, FLOSEAL, 10 ML

## (undated) DEVICE — STRIP, SKIN CLOSURE, STERI-STRIP, REINFORCED, 0.25 X 4 IN

## (undated) DEVICE — COVER, EQUIPMENT, C ARM, W/ STRAPS

## (undated) DEVICE — WAX, BONE, 2.5 GM